# Patient Record
Sex: FEMALE | URBAN - METROPOLITAN AREA
[De-identification: names, ages, dates, MRNs, and addresses within clinical notes are randomized per-mention and may not be internally consistent; named-entity substitution may affect disease eponyms.]

---

## 2019-02-12 PROBLEM — F50.9 EATING DISORDER: Status: ACTIVE | Noted: 2019-02-12

## 2019-04-26 ENCOUNTER — APPOINTMENT (OUTPATIENT)
Age: 24
Setting detail: DERMATOLOGY
End: 2019-04-26

## 2019-04-26 DIAGNOSIS — L70.0 ACNE VULGARIS: ICD-10-CM

## 2019-04-26 PROCEDURE — OTHER PRESCRIPTION: OTHER

## 2019-04-26 PROCEDURE — OTHER URINE PREGNANCY TEST: OTHER

## 2019-04-26 PROCEDURE — OTHER COUNSELING: ISOTRETINOIN: OTHER

## 2019-04-26 PROCEDURE — 81025 URINE PREGNANCY TEST: CPT

## 2019-04-26 RX ORDER — ISOTRETINOIN 40 MG/1
40 MG CAPSULE, LIQUID FILLED ORAL QD
Qty: 30 | Refills: 0 | Status: ERX | COMMUNITY
Start: 2019-04-26

## 2019-05-22 ENCOUNTER — APPOINTMENT (OUTPATIENT)
Age: 24
Setting detail: DERMATOLOGY
End: 2019-05-27

## 2019-05-22 VITALS — HEIGHT: 65 IN | WEIGHT: 110 LBS | RESPIRATION RATE: 16 BRPM

## 2019-05-22 DIAGNOSIS — L70.0 ACNE VULGARIS: ICD-10-CM

## 2019-05-22 DIAGNOSIS — L60.3 NAIL DYSTROPHY: ICD-10-CM

## 2019-05-22 PROCEDURE — OTHER COUNSELING: ISOTRETINOIN: OTHER

## 2019-05-22 PROCEDURE — OTHER ISOTRETINOIN MONITORING: OTHER

## 2019-05-22 PROCEDURE — OTHER PRESCRIPTION: OTHER

## 2019-05-22 PROCEDURE — 81025 URINE PREGNANCY TEST: CPT

## 2019-05-22 PROCEDURE — 99214 OFFICE O/P EST MOD 30 MIN: CPT

## 2019-05-22 PROCEDURE — OTHER URINE PREGNANCY TEST: OTHER

## 2019-05-22 RX ORDER — CICLOPIROX 80 MG/ML
8% SOLUTION TOPICAL EVERY NIGHT
Qty: 1 | Refills: 0 | Status: ERX | COMMUNITY
Start: 2019-05-22

## 2019-05-22 ASSESSMENT — LOCATION DETAILED DESCRIPTION DERM
LOCATION DETAILED: RIGHT INFERIOR CENTRAL MALAR CHEEK
LOCATION DETAILED: INFERIOR MID FOREHEAD
LOCATION DETAILED: LEFT CENTRAL BUCCAL CHEEK

## 2019-05-22 ASSESSMENT — LOCATION ZONE DERM: LOCATION ZONE: FACE

## 2019-05-22 ASSESSMENT — LOCATION SIMPLE DESCRIPTION DERM
LOCATION SIMPLE: LEFT CHEEK
LOCATION SIMPLE: RIGHT CHEEK
LOCATION SIMPLE: INFERIOR FOREHEAD

## 2019-07-01 ENCOUNTER — APPOINTMENT (OUTPATIENT)
Age: 24
Setting detail: DERMATOLOGY
End: 2019-07-01

## 2019-07-01 VITALS — WEIGHT: 100 LBS | RESPIRATION RATE: 16 BRPM | HEIGHT: 66 IN

## 2019-07-01 DIAGNOSIS — L70.0 ACNE VULGARIS: ICD-10-CM

## 2019-07-01 PROCEDURE — OTHER PRESCRIPTION: OTHER

## 2019-07-01 PROCEDURE — 99213 OFFICE O/P EST LOW 20 MIN: CPT

## 2019-07-01 PROCEDURE — 81025 URINE PREGNANCY TEST: CPT

## 2019-07-01 PROCEDURE — OTHER ISOTRETINOIN MONITORING: OTHER

## 2019-07-01 PROCEDURE — OTHER URINE PREGNANCY TEST: OTHER

## 2019-07-01 RX ORDER — ISOTRETINOIN 40 MG/1
CAPSULE, LIQUID FILLED ORAL QD
Qty: 30 | Refills: 0 | Status: ERX

## 2019-07-01 ASSESSMENT — LOCATION ZONE DERM: LOCATION ZONE: FACE

## 2019-07-01 ASSESSMENT — LOCATION SIMPLE DESCRIPTION DERM: LOCATION SIMPLE: LEFT CHEEK

## 2019-07-01 ASSESSMENT — LOCATION DETAILED DESCRIPTION DERM: LOCATION DETAILED: LEFT INFERIOR MEDIAL MALAR CHEEK

## 2019-07-01 NOTE — HPI: MEDICATION (ACCUTANE)
How Severe Is It?: moderate
Is This A New Presentation, Or A Follow-Up?: Evaluation for Accutane
Additional History: Water 32 oz, Gatorade,

## 2019-07-29 ENCOUNTER — APPOINTMENT (OUTPATIENT)
Age: 24
Setting detail: DERMATOLOGY
End: 2019-07-31

## 2019-07-29 VITALS — RESPIRATION RATE: 16 BRPM | HEIGHT: 55 IN | WEIGHT: 220.46 LBS

## 2019-07-29 DIAGNOSIS — L70.0 ACNE VULGARIS: ICD-10-CM

## 2019-07-29 DIAGNOSIS — L72.8 OTHER FOLLICULAR CYSTS OF THE SKIN AND SUBCUTANEOUS TISSUE: ICD-10-CM

## 2019-07-29 PROCEDURE — 81025 URINE PREGNANCY TEST: CPT

## 2019-07-29 PROCEDURE — 99214 OFFICE O/P EST MOD 30 MIN: CPT

## 2019-07-29 PROCEDURE — OTHER COUNSELING: OTHER

## 2019-07-29 PROCEDURE — OTHER ISOTRETINOIN MONITORING: OTHER

## 2019-07-29 PROCEDURE — OTHER COUNSELING: ISOTRETINOIN: OTHER

## 2019-07-29 PROCEDURE — OTHER URINE PREGNANCY TEST: OTHER

## 2019-07-29 ASSESSMENT — LOCATION ZONE DERM: LOCATION ZONE: FACE

## 2019-07-29 ASSESSMENT — LOCATION DETAILED DESCRIPTION DERM
LOCATION DETAILED: LEFT SUPERIOR CENTRAL MALAR CHEEK
LOCATION DETAILED: LEFT INFERIOR CENTRAL MALAR CHEEK

## 2019-07-29 ASSESSMENT — LOCATION SIMPLE DESCRIPTION DERM: LOCATION SIMPLE: LEFT CHEEK

## 2019-07-29 NOTE — PROCEDURE: COUNSELING
Topical Retinoid counseling:  Patient advised to apply a pea-sized amount only at bedtime and wait 30 minutes after washing their face before applying.  If too drying, patient may add a non-comedogenic moisturizer. The patient verbalized understanding of the proper use and possible adverse effects of retinoids.  All of the patient's questions and concerns were addressed.
Minocycline Pregnancy And Lactation Text: This medication is Pregnancy Category D and not consider safe during pregnancy. It is also excreted in breast milk.
Erythromycin Pregnancy And Lactation Text: This medication is Pregnancy Category B and is considered safe during pregnancy. It is also excreted in breast milk.
Dapsone Counseling: I discussed with the patient the risks of dapsone including but not limited to hemolytic anemia, agranulocytosis, rashes, methemoglobinemia, kidney failure, peripheral neuropathy, headaches, GI upset, and liver toxicity.  Patients who start dapsone require monitoring including baseline LFTs and weekly CBCs for the first month, then every month thereafter.  The patient verbalized understanding of the proper use and possible adverse effects of dapsone.  All of the patient's questions and concerns were addressed.
Spironolactone Pregnancy And Lactation Text: This medication can cause feminization of the male fetus and should be avoided during pregnancy. The active metabolite is also found in breast milk.
Topical Retinoid Pregnancy And Lactation Text: This medication is Pregnancy Category C. It is unknown if this medication is excreted in breast milk.
Patient Specific Counseling (Will Not Stick From Patient To Patient): Discussed using her clindamycin 1% lotion to area until completely resolved.
Minocycline Counseling: Patient advised regarding possible photosensitivity and discoloration of the teeth, skin, lips, tongue and gums.  Patient instructed to avoid sunlight, if possible.  When exposed to sunlight, patients should wear protective clothing, sunglasses, and sunscreen.  The patient was instructed to call the office immediately if the following severe adverse effects occur:  hearing changes, easy bruising/bleeding, severe headache, or vision changes.  The patient verbalized understanding of the proper use and possible adverse effects of minocycline.  All of the patient's questions and concerns were addressed.
Bactrim Counseling:  I discussed with the patient the risks of sulfa antibiotics including but not limited to GI upset, allergic reaction, drug rash, diarrhea, dizziness, photosensitivity, and yeast infections.  Rarely, more serious reactions can occur including but not limited to aplastic anemia, agranulocytosis, methemoglobinemia, blood dyscrasias, liver or kidney failure, lung infiltrates or desquamative/blistering drug rashes.
Tetracycline Counseling: Patient counseled regarding possible photosensitivity and increased risk for sunburn.  Patient instructed to avoid sunlight, if possible.  When exposed to sunlight, patients should wear protective clothing, sunglasses, and sunscreen.  The patient was instructed to call the office immediately if the following severe adverse effects occur:  hearing changes, easy bruising/bleeding, severe headache, or vision changes.  The patient verbalized understanding of the proper use and possible adverse effects of tetracycline.  All of the patient's questions and concerns were addressed. Patient understands to avoid pregnancy while on therapy due to potential birth defects.
Birth Control Pills Pregnancy And Lactation Text: This medication should be avoided if pregnant and for the first 30 days post-partum.
Benzoyl Peroxide Counseling: Patient counseled that medicine may cause skin irritation and bleach clothing.  In the event of skin irritation, the patient was advised to reduce the amount of the drug applied or use it less frequently.   The patient verbalized understanding of the proper use and possible adverse effects of benzoyl peroxide.  All of the patient's questions and concerns were addressed.
Doxycycline Pregnancy And Lactation Text: This medication is Pregnancy Category D and not consider safe during pregnancy. It is also excreted in breast milk but is considered safe for shorter treatment courses.
Topical Sulfur Applications Counseling: Topical Sulfur Counseling: Patient counseled that this medication may cause skin irritation or allergic reactions.  In the event of skin irritation, the patient was advised to reduce the amount of the drug applied or use it less frequently.   The patient verbalized understanding of the proper use and possible adverse effects of topical sulfur application.  All of the patient's questions and concerns were addressed.
High Dose Vitamin A Counseling: Side effects reviewed, pt to contact office should one occur.
Isotretinoin Pregnancy And Lactation Text: This medication is Pregnancy Category X and is considered extremely dangerous during pregnancy. It is unknown if it is excreted in breast milk.
Spironolactone Counseling: Patient advised regarding risks of diarrhea, abdominal pain, hyperkalemia, birth defects (for female patients), liver toxicity and renal toxicity. The patient may need blood work to monitor liver and kidney function and potassium levels while on therapy. The patient verbalized understanding of the proper use and possible adverse effects of spironolactone.  All of the patient's questions and concerns were addressed.
Birth Control Pills Counseling: Birth Control Pill Counseling: I discussed with the patient the potential side effects of OCPs including but not limited to increased risk of stroke, heart attack, thrombophlebitis, deep venous thrombosis, hepatic adenomas, breast changes, GI upset, headaches, and depression.  The patient verbalized understanding of the proper use and possible adverse effects of OCPs. All of the patient's questions and concerns were addressed.
Topical Clindamycin Counseling: Patient counseled that this medication may cause skin irritation or allergic reactions.  In the event of skin irritation, the patient was advised to reduce the amount of the drug applied or use it less frequently.   The patient verbalized understanding of the proper use and possible adverse effects of clindamycin.  All of the patient's questions and concerns were addressed.
Azithromycin Counseling:  I discussed with the patient the risks of azithromycin including but not limited to GI upset, allergic reaction, drug rash, diarrhea, and yeast infections.
Topical Clindamycin Pregnancy And Lactation Text: This medication is Pregnancy Category B and is considered safe during pregnancy. It is unknown if it is excreted in breast milk.
Bactrim Pregnancy And Lactation Text: This medication is Pregnancy Category D and is known to cause fetal risk.  It is also excreted in breast milk.
Tazorac Counseling:  Patient advised that medication is irritating and drying.  Patient may need to apply sparingly and wash off after an hour before eventually leaving it on overnight.  The patient verbalized understanding of the proper use and possible adverse effects of tazorac.  All of the patient's questions and concerns were addressed.
Topical Sulfur Applications Pregnancy And Lactation Text: This medication is Pregnancy Category C and has an unknown safety profile during pregnancy. It is unknown if this topical medication is excreted in breast milk.
Use Enhanced Medication Counseling?: No
Patient Specific Counseling (Will Not Stick From Patient To Patient): Will increase the dose up to 30mg BID. Discussed increasing water intake. Finish the pills at home and then start the 30mg BID. Will do fasting labs next month. Confirmed in ipledge.
Erythromycin Counseling:  I discussed with the patient the risks of erythromycin including but not limited to GI upset, allergic reaction, drug rash, diarrhea, increase in liver enzymes, and yeast infections.
Benzoyl Peroxide Pregnancy And Lactation Text: This medication is Pregnancy Category C. It is unknown if benzoyl peroxide is excreted in breast milk.
Tazorac Pregnancy And Lactation Text: This medication is not safe during pregnancy. It is unknown if this medication is excreted in breast milk.
Dapsone Pregnancy And Lactation Text: This medication is Pregnancy Category C and is not considered safe during pregnancy or breast feeding.
Detail Level: Zone
Isotretinoin Counseling: Patient should get monthly blood tests, not donate blood, not drive at night if vision affected, not share medication, and not undergo elective surgery for 6 months after tx completed. Side effects reviewed, pt to contact office should one occur.
Doxycycline Counseling:  Patient counseled regarding possible photosensitivity and increased risk for sunburn.  Patient instructed to avoid sunlight, if possible.  When exposed to sunlight, patients should wear protective clothing, sunglasses, and sunscreen.  The patient was instructed to call the office immediately if the following severe adverse effects occur:  hearing changes, easy bruising/bleeding, severe headache, or vision changes.  The patient verbalized understanding of the proper use and possible adverse effects of doxycycline.  All of the patient's questions and concerns were addressed.
Detail Level: Detailed
Azithromycin Pregnancy And Lactation Text: This medication is considered safe during pregnancy and is also secreted in breast milk.
High Dose Vitamin A Pregnancy And Lactation Text: High dose vitamin A therapy is contraindicated during pregnancy and breast feeding.

## 2019-07-29 NOTE — PROCEDURE: ISOTRETINOIN MONITORING
Nosebleeds Normal Treatment: I explained this is common when taking isotretinoin. Pt also has seasonal allergies.

## 2019-07-31 ENCOUNTER — RX ONLY (RX ONLY)
Age: 24
End: 2019-07-31

## 2019-07-31 RX ORDER — ISOTRETINOIN 30 MG/1
30 MG CAPSULE, LIQUID FILLED ORAL TWICE A DAY
Qty: 60 | Refills: 0 | Status: ERX | COMMUNITY
Start: 2019-07-31

## 2019-12-04 PROBLEM — F32.0 MILD MAJOR DEPRESSION (H): Status: ACTIVE | Noted: 2019-12-04

## 2020-04-14 NOTE — PROCEDURE: ISOTRETINOIN MONITORING
I have complete yany in the body's ability to heal and transform. The products and items listed below (the “Products”)  and their claims have not been evaluated by the Food and Drug Administration.  Dietary products are not intended to treat, prevent, m Directions: 1 capsule by mouth daily for 2 weeks, then increase to 1 capsule twice daily. After another 2 weeks increase to 1 capsule three times per day. Where to Find: www.[employer]. [employer]  Why: Aids in the repair and function of the gut mu Banana  Beef  Broccoli  Coffee  Corn  Chicken  Peanut  Tomato  White Potato  Almonds  Pineapple  Lafferty  Shrimp  Turmeric  Please note: The FIT 22 test does not come with the mobile phone mary or meal plan.     [facility] Inflammation Test for [employer] Nosebleeds Normal Treatment: I explained this is common when taking isotretinoin. Pt also has seasonal allergies.

## 2020-10-21 ENCOUNTER — APPOINTMENT (OUTPATIENT)
Dept: URBAN - METROPOLITAN AREA CLINIC 252 | Age: 25
Setting detail: DERMATOLOGY
End: 2020-10-25

## 2020-10-21 VITALS — WEIGHT: 100 LBS | HEIGHT: 66 IN | RESPIRATION RATE: 14 BRPM

## 2020-10-21 DIAGNOSIS — L70.0 ACNE VULGARIS: ICD-10-CM

## 2020-10-21 DIAGNOSIS — L90.5 SCAR CONDITIONS AND FIBROSIS OF SKIN: ICD-10-CM

## 2020-10-21 PROCEDURE — OTHER COUNSELING: OTHER

## 2020-10-21 PROCEDURE — 99213 OFFICE O/P EST LOW 20 MIN: CPT | Mod: 25

## 2020-10-21 PROCEDURE — OTHER ADDITIONAL NOTES: OTHER

## 2020-10-21 PROCEDURE — 11900 INJECT SKIN LESIONS </W 7: CPT

## 2020-10-21 PROCEDURE — OTHER INTRALESIONAL KENALOG: OTHER

## 2020-10-21 PROCEDURE — OTHER PRESCRIPTION: OTHER

## 2020-10-21 RX ORDER — CLINDAMYCIN PHOSPHATE 10 MG/G
1% GEL TOPICAL QAM
Qty: 1 | Refills: 1 | Status: ERX | COMMUNITY
Start: 2020-10-21

## 2020-10-21 RX ORDER — TRETIONIN 0.25 MG/G
0.025% CREAM TOPICAL QD
Qty: 1 | Refills: 3 | Status: ERX | COMMUNITY
Start: 2020-10-21

## 2020-10-21 ASSESSMENT — LOCATION DETAILED DESCRIPTION DERM
LOCATION DETAILED: LEFT INFERIOR CENTRAL MALAR CHEEK
LOCATION DETAILED: RIGHT INFERIOR CENTRAL MALAR CHEEK
LOCATION DETAILED: RIGHT INFERIOR LATERAL MALAR CHEEK

## 2020-10-21 ASSESSMENT — LOCATION SIMPLE DESCRIPTION DERM
LOCATION SIMPLE: RIGHT CHEEK
LOCATION SIMPLE: LEFT CHEEK

## 2020-10-21 ASSESSMENT — LOCATION ZONE DERM: LOCATION ZONE: FACE

## 2020-10-21 NOTE — PROCEDURE: ADDITIONAL NOTES
Detail Level: Simple
Additional Notes: Will consider spironolactone next OV if patient continues to break out

## 2020-10-21 NOTE — HPI: PIMPLES (ACNE)
What Type Of Note Output Would You Prefer (Optional)?: Bullet Format
How Severe Is Your Acne?: mild
Is This A New Presentation, Or A Follow-Up?: Acne
Additional Comments (Use Complete Sentences): Works at [employer]

## 2020-10-21 NOTE — PROCEDURE: INTRALESIONAL KENALOG
Consent: The risks of atrophy were reviewed with the patient.
Medical Necessity Clause: This procedure was medically necessary because the lesions that were treated were:
Total Volume Injected (Ccs- Only Use Numbers And Decimals): 0.09
X Size Of Lesion In Cm (Optional): 0
Kenalog Preparation: Kenalog
Include Z78.9 (Other Specified Conditions Influencing Health Status) As An Associated Diagnosis?: No
Concentration Of Solution Injected (Mg/Ml): 2.0
Detail Level: Detailed

## 2020-10-21 NOTE — PROCEDURE: COUNSELING
Isotretinoin Pregnancy And Lactation Text: This medication is Pregnancy Category X and is considered extremely dangerous during pregnancy. It is unknown if it is excreted in breast milk.
Tazorac Counseling:  Patient advised that medication is irritating and drying.  Patient may need to apply sparingly and wash off after an hour before eventually leaving it on overnight.  The patient verbalized understanding of the proper use and possible adverse effects of tazorac.  All of the patient's questions and concerns were addressed.
Topical Retinoid Pregnancy And Lactation Text: This medication is Pregnancy Category C. It is unknown if this medication is excreted in breast milk.
Bactrim Counseling:  I discussed with the patient the risks of sulfa antibiotics including but not limited to GI upset, allergic reaction, drug rash, diarrhea, dizziness, photosensitivity, and yeast infections.  Rarely, more serious reactions can occur including but not limited to aplastic anemia, agranulocytosis, methemoglobinemia, blood dyscrasias, liver or kidney failure, lung infiltrates or desquamative/blistering drug rashes.
Topical Sulfur Applications Pregnancy And Lactation Text: This medication is Pregnancy Category C and has an unknown safety profile during pregnancy. It is unknown if this topical medication is excreted in breast milk.
Birth Control Pills Counseling: Birth Control Pill Counseling: I discussed with the patient the potential side effects of OCPs including but not limited to increased risk of stroke, heart attack, thrombophlebitis, deep venous thrombosis, hepatic adenomas, breast changes, GI upset, headaches, and depression.  The patient verbalized understanding of the proper use and possible adverse effects of OCPs. All of the patient's questions and concerns were addressed.
Doxycycline Counseling:  Patient counseled regarding possible photosensitivity and increased risk for sunburn.  Patient instructed to avoid sunlight, if possible.  When exposed to sunlight, patients should wear protective clothing, sunglasses, and sunscreen.  The patient was instructed to call the office immediately if the following severe adverse effects occur:  hearing changes, easy bruising/bleeding, severe headache, or vision changes.  The patient verbalized understanding of the proper use and possible adverse effects of doxycycline.  All of the patient's questions and concerns were addressed.
Topical Clindamycin Counseling: Patient counseled that this medication may cause skin irritation or allergic reactions.  In the event of skin irritation, the patient was advised to reduce the amount of the drug applied or use it less frequently.   The patient verbalized understanding of the proper use and possible adverse effects of clindamycin.  All of the patient's questions and concerns were addressed.
Benzoyl Peroxide Counseling: Patient counseled that medicine may cause skin irritation and bleach clothing.  In the event of skin irritation, the patient was advised to reduce the amount of the drug applied or use it less frequently.   The patient verbalized understanding of the proper use and possible adverse effects of benzoyl peroxide.  All of the patient's questions and concerns were addressed.
Spironolactone Pregnancy And Lactation Text: This medication can cause feminization of the male fetus and should be avoided during pregnancy. The active metabolite is also found in breast milk.
Bactrim Pregnancy And Lactation Text: This medication is Pregnancy Category D and is known to cause fetal risk.  It is also excreted in breast milk.
Tetracycline Pregnancy And Lactation Text: This medication is Pregnancy Category D and not consider safe during pregnancy. It is also excreted in breast milk.
Spironolactone Counseling: Patient advised regarding risks of diarrhea, abdominal pain, hyperkalemia, birth defects (for female patients), liver toxicity and renal toxicity. The patient may need blood work to monitor liver and kidney function and potassium levels while on therapy. The patient verbalized understanding of the proper use and possible adverse effects of spironolactone.  All of the patient's questions and concerns were addressed.
Topical Retinoid counseling:  Patient advised to apply a pea-sized amount only at bedtime and wait 30 minutes after washing their face before applying.  If too drying, patient may add a non-comedogenic moisturizer. The patient verbalized understanding of the proper use and possible adverse effects of retinoids.  All of the patient's questions and concerns were addressed.
Benzoyl Peroxide Pregnancy And Lactation Text: This medication is Pregnancy Category C. It is unknown if benzoyl peroxide is excreted in breast milk.
Erythromycin Pregnancy And Lactation Text: This medication is Pregnancy Category B and is considered safe during pregnancy. It is also excreted in breast milk.
Isotretinoin Counseling: Patient should get monthly blood tests, not donate blood, not drive at night if vision affected, not share medication, and not undergo elective surgery for 6 months after tx completed. Side effects reviewed, pt to contact office should one occur.
Detail Level: Detailed
High Dose Vitamin A Pregnancy And Lactation Text: High dose vitamin A therapy is contraindicated during pregnancy and breast feeding.
Minocycline Counseling: Patient advised regarding possible photosensitivity and discoloration of the teeth, skin, lips, tongue and gums.  Patient instructed to avoid sunlight, if possible.  When exposed to sunlight, patients should wear protective clothing, sunglasses, and sunscreen.  The patient was instructed to call the office immediately if the following severe adverse effects occur:  hearing changes, easy bruising/bleeding, severe headache, or vision changes.  The patient verbalized understanding of the proper use and possible adverse effects of minocycline.  All of the patient's questions and concerns were addressed.
Tazorac Pregnancy And Lactation Text: This medication is not safe during pregnancy. It is unknown if this medication is excreted in breast milk.
Azithromycin Counseling:  I discussed with the patient the risks of azithromycin including but not limited to GI upset, allergic reaction, drug rash, diarrhea, and yeast infections.
Birth Control Pills Pregnancy And Lactation Text: This medication should be avoided if pregnant and for the first 30 days post-partum.
High Dose Vitamin A Counseling: Side effects reviewed, pt to contact office should one occur.
Tetracycline Counseling: Patient counseled regarding possible photosensitivity and increased risk for sunburn.  Patient instructed to avoid sunlight, if possible.  When exposed to sunlight, patients should wear protective clothing, sunglasses, and sunscreen.  The patient was instructed to call the office immediately if the following severe adverse effects occur:  hearing changes, easy bruising/bleeding, severe headache, or vision changes.  The patient verbalized understanding of the proper use and possible adverse effects of tetracycline.  All of the patient's questions and concerns were addressed. Patient understands to avoid pregnancy while on therapy due to potential birth defects.
Sarecycline Counseling: Patient advised regarding possible photosensitivity and discoloration of the teeth, skin, lips, tongue and gums.  Patient instructed to avoid sunlight, if possible.  When exposed to sunlight, patients should wear protective clothing, sunglasses, and sunscreen.  The patient was instructed to call the office immediately if the following severe adverse effects occur:  hearing changes, easy bruising/bleeding, severe headache, or vision changes.  The patient verbalized understanding of the proper use and possible adverse effects of sarecycline.  All of the patient's questions and concerns were addressed.
Topical Sulfur Applications Counseling: Topical Sulfur Counseling: Patient counseled that this medication may cause skin irritation or allergic reactions.  In the event of skin irritation, the patient was advised to reduce the amount of the drug applied or use it less frequently.   The patient verbalized understanding of the proper use and possible adverse effects of topical sulfur application.  All of the patient's questions and concerns were addressed.
Dapsone Counseling: I discussed with the patient the risks of dapsone including but not limited to hemolytic anemia, agranulocytosis, rashes, methemoglobinemia, kidney failure, peripheral neuropathy, headaches, GI upset, and liver toxicity.  Patients who start dapsone require monitoring including baseline LFTs and weekly CBCs for the first month, then every month thereafter.  The patient verbalized understanding of the proper use and possible adverse effects of dapsone.  All of the patient's questions and concerns were addressed.
Doxycycline Pregnancy And Lactation Text: This medication is Pregnancy Category D and not consider safe during pregnancy. It is also excreted in breast milk but is considered safe for shorter treatment courses.
Dapsone Pregnancy And Lactation Text: This medication is Pregnancy Category C and is not considered safe during pregnancy or breast feeding.
Erythromycin Counseling:  I discussed with the patient the risks of erythromycin including but not limited to GI upset, allergic reaction, drug rash, diarrhea, increase in liver enzymes, and yeast infections.
Detail Level: Zone
Topical Clindamycin Pregnancy And Lactation Text: This medication is Pregnancy Category B and is considered safe during pregnancy. It is unknown if it is excreted in breast milk.
Azithromycin Pregnancy And Lactation Text: This medication is considered safe during pregnancy and is also secreted in breast milk.
Use Enhanced Medication Counseling?: No

## 2020-11-20 NOTE — PROCEDURE: ISOTRETINOIN MONITORING
1 pair Cheilitis Normal Treatment: I recommended application of Vaseline or Aquaphor numerous times a day (as often as every hour) and before going to bed.

## 2020-12-30 PROBLEM — M54.42 BILATERAL LOW BACK PAIN WITH LEFT-SIDED SCIATICA: Status: ACTIVE | Noted: 2020-12-30

## 2021-02-18 PROBLEM — M54.42 BILATERAL LOW BACK PAIN WITH LEFT-SIDED SCIATICA: Status: RESOLVED | Noted: 2020-12-30 | Resolved: 2021-02-18

## 2021-10-19 PROBLEM — F32.9 MAJOR DEPRESSION: Status: ACTIVE | Noted: 2019-12-04

## 2023-03-21 PROBLEM — F41.1 GAD (GENERALIZED ANXIETY DISORDER): Status: ACTIVE | Noted: 2023-03-21

## 2023-03-21 PROBLEM — F32.0 MILD MAJOR DEPRESSION (H): Status: ACTIVE | Noted: 2023-03-21

## 2023-05-24 ENCOUNTER — APPOINTMENT (OUTPATIENT)
Dept: URBAN - METROPOLITAN AREA CLINIC 252 | Age: 28
Setting detail: DERMATOLOGY
End: 2023-05-29

## 2023-05-24 VITALS — WEIGHT: 108 LBS | HEIGHT: 65 IN | RESPIRATION RATE: 18 BRPM

## 2023-05-24 DIAGNOSIS — L90.5 SCAR CONDITIONS AND FIBROSIS OF SKIN: ICD-10-CM

## 2023-05-24 PROCEDURE — OTHER COUNSELING: OTHER

## 2023-05-24 PROCEDURE — 99212 OFFICE O/P EST SF 10 MIN: CPT

## 2023-05-24 ASSESSMENT — LOCATION DETAILED DESCRIPTION DERM
LOCATION DETAILED: RIGHT LATERAL BREAST 6-7:00 REGION
LOCATION DETAILED: RIGHT PERIAREOLAR BREAST 6-7:00 REGION
LOCATION DETAILED: LEFT MEDIAL BREAST 6-7:00 REGION

## 2023-05-24 ASSESSMENT — LOCATION ZONE DERM: LOCATION ZONE: TRUNK

## 2023-05-24 ASSESSMENT — LOCATION SIMPLE DESCRIPTION DERM
LOCATION SIMPLE: LEFT BREAST
LOCATION SIMPLE: RIGHT BREAST

## 2023-05-24 NOTE — PROCEDURE: COUNSELING
Patient Specific Counseling (Will Not Stick From Patient To Patient): -Patient 1 year post op from breast augmentation. Informed her that with time, it should improve \\n-Discussed laser treatment options such as VBeam. [facility] laser specialist may have more options \\n-Recommend silicone sheeting, gave brand name biodermis an am example option
Detail Level: Simple

## 2023-08-30 PROBLEM — E46 PROTEIN-CALORIE MALNUTRITION (H): Status: ACTIVE | Noted: 2023-08-30

## 2024-01-08 PROBLEM — G43.011 INTRACTABLE MIGRAINE WITHOUT AURA AND WITH STATUS MIGRAINOSUS: Status: ACTIVE | Noted: 2024-01-08

## 2024-01-08 PROBLEM — M54.42 ACUTE BILATERAL LOW BACK PAIN WITH BILATERAL SCIATICA: Status: ACTIVE | Noted: 2024-01-08

## 2024-01-08 PROBLEM — F90.0 ADHD (ATTENTION DEFICIT HYPERACTIVITY DISORDER), INATTENTIVE TYPE: Status: ACTIVE | Noted: 2023-04-20

## 2024-01-08 PROBLEM — F33.1 MAJOR DEPRESSIVE DISORDER, RECURRENT EPISODE, MODERATE (H): Status: ACTIVE | Noted: 2024-01-08

## 2024-01-08 PROBLEM — M54.41 ACUTE BILATERAL LOW BACK PAIN WITH BILATERAL SCIATICA: Status: ACTIVE | Noted: 2024-01-08

## 2024-01-30 ENCOUNTER — APPOINTMENT (OUTPATIENT)
Dept: URBAN - METROPOLITAN AREA CLINIC 252 | Age: 29
Setting detail: DERMATOLOGY
End: 2024-01-31

## 2024-01-30 VITALS — WEIGHT: 110 LBS | RESPIRATION RATE: 18 BRPM | HEIGHT: 65 IN

## 2024-01-30 DIAGNOSIS — L70.0 ACNE VULGARIS: ICD-10-CM

## 2024-01-30 DIAGNOSIS — D49.2 NEOPLASM OF UNSPECIFIED BEHAVIOR OF BONE, SOFT TISSUE, AND SKIN: ICD-10-CM

## 2024-01-30 PROCEDURE — 99213 OFFICE O/P EST LOW 20 MIN: CPT | Mod: 25

## 2024-01-30 PROCEDURE — 11300 SHAVE SKIN LESION 0.5 CM/<: CPT

## 2024-01-30 PROCEDURE — OTHER ADDITIONAL NOTES: OTHER

## 2024-01-30 PROCEDURE — OTHER SHAVE REMOVAL (NO PATHOLOGY): OTHER

## 2024-01-30 PROCEDURE — OTHER COUNSELING: OTHER

## 2024-01-30 PROCEDURE — OTHER PRESCRIPTION: OTHER

## 2024-01-30 RX ORDER — TRETINOIN 0.1 MG/G
0.01% GEL TOPICAL QD
Qty: 45 | Refills: 3 | Status: ERX | COMMUNITY
Start: 2024-01-30

## 2024-01-30 ASSESSMENT — LOCATION DETAILED DESCRIPTION DERM
LOCATION DETAILED: LEFT MID-UPPER BACK
LOCATION DETAILED: LEFT INFERIOR CENTRAL MALAR CHEEK

## 2024-01-30 ASSESSMENT — LOCATION SIMPLE DESCRIPTION DERM
LOCATION SIMPLE: LEFT UPPER BACK
LOCATION SIMPLE: LEFT CHEEK

## 2024-01-30 ASSESSMENT — LOCATION ZONE DERM
LOCATION ZONE: TRUNK
LOCATION ZONE: FACE

## 2024-01-30 NOTE — HPI: SKIN LESION
What Type Of Note Output Would You Prefer (Optional)?: Bullet Format
How Severe Is Your Skin Lesion?: moderate
Has Your Skin Lesion Been Treated?: not been treated
Is This A New Presentation, Or A Follow-Up?: Skin Lesion
Additional History: Defers pathology report

## 2024-04-01 PROBLEM — L70.0 ACNE VULGARIS: Status: ACTIVE | Noted: 2024-04-01

## 2024-05-24 PROBLEM — M54.50 RECURRENT LOW BACK PAIN: Status: ACTIVE | Noted: 2024-05-24

## 2024-12-16 ENCOUNTER — ANCILLARY PROCEDURE (OUTPATIENT)
Dept: INTERVENTIONAL RADIOLOGY/VASCULAR | Facility: CLINIC | Age: 29
End: 2024-12-16
Attending: PHYSICIAN ASSISTANT
Payer: COMMERCIAL

## 2024-12-16 VITALS
DIASTOLIC BLOOD PRESSURE: 83 MMHG | SYSTOLIC BLOOD PRESSURE: 134 MMHG | HEART RATE: 105 BPM | RESPIRATION RATE: 18 BRPM | OXYGEN SATURATION: 99 % | TEMPERATURE: 98.1 F

## 2024-12-16 DIAGNOSIS — M54.16 LUMBAR RADICULOPATHY: ICD-10-CM

## 2024-12-16 PROCEDURE — 64483 NJX AA&/STRD TFRM EPI L/S 1: CPT | Mod: LT | Performed by: RADIOLOGY

## 2024-12-16 RX ORDER — IOPAMIDOL 408 MG/ML
10 INJECTION, SOLUTION INTRATHECAL ONCE
Status: COMPLETED | OUTPATIENT
Start: 2024-12-16 | End: 2024-12-16

## 2024-12-16 RX ORDER — METHYLPREDNISOLONE ACETATE 80 MG/ML
80 INJECTION, SUSPENSION INTRA-ARTICULAR; INTRALESIONAL; INTRAMUSCULAR; SOFT TISSUE ONCE
Status: COMPLETED | OUTPATIENT
Start: 2024-12-16 | End: 2024-12-16

## 2024-12-16 RX ORDER — BUPIVACAINE HYDROCHLORIDE 5 MG/ML
10 INJECTION, SOLUTION PERINEURAL ONCE
Status: COMPLETED | OUTPATIENT
Start: 2024-12-16 | End: 2024-12-16

## 2024-12-16 RX ADMIN — IOPAMIDOL 4 ML: 408 INJECTION, SOLUTION INTRATHECAL at 11:21

## 2024-12-16 RX ADMIN — BUPIVACAINE HYDROCHLORIDE 10 MG: 5 INJECTION, SOLUTION PERINEURAL at 11:21

## 2024-12-16 RX ADMIN — METHYLPREDNISOLONE ACETATE 80 MG: 80 INJECTION, SUSPENSION INTRA-ARTICULAR; INTRALESIONAL; INTRAMUSCULAR; SOFT TISSUE at 11:21

## 2024-12-16 NOTE — DISCHARGE INSTRUCTIONS
Date: 12/16/24  Provider: Dr. JOCELYN Tapia, neuroradiology department  Location: [facility], Imaging Department, [place]    Home Care Instructions after your Steroid Injection:  If you have new numbness down your legs after the injection, this may last up to 4-6 hours, but is expected go away.   Over the next 24 to 48 hours, pain at the injection site may increase before it gets better.  For the next 48 hours, use ice packs for 15 minutes, 3-4 times a day for pain relief.  If you have a bandage, remove it tomorrow morning.     Do not submerge injection site in water for 24 hours (no baths. pools, hot tubs).  Showers are OK.            Relax today. Return to your regular activity tomorrow.  Do not drive for 24 hours.         Medications  Restart blood thinning/anticoagulant medication tomorrow at your regular dose.  If you are restarting warfarin, discuss a follow up plan/labs with you anticoagulation clinic.  Continue to take all other medications as ordered by your provider.    Common side effects may include:  Increased blood sugar.  (If you are diabetic, watch your blood sugar closely. Call your doctor to help control your blood sugar)   Insomnia  Heartburn  Flushed face  Water retention  Increased appetite    Important Information:  The steroid should help reduce swelling and pain. This may take from 3-14 days.   Follow up with the provider that ordered this injection in 3-4 weeks. Let them know if the injection was effective.  No vaccines in the next 14 days. (Ex. COVID or FLU VACCINE)    Call your doctor or go to the Emergency Room for evaluation:  If you have NEW severe pain.  Fever greater than 100.5 degrees.  Loss of control of your legs (arms or legs depending on level)        Do you rec E/C visit?

## 2024-12-17 ENCOUNTER — MYC MEDICAL ADVICE (OUTPATIENT)
Dept: ORTHOPEDICS | Facility: CLINIC | Age: 29
End: 2024-12-17
Payer: COMMERCIAL

## 2024-12-17 NOTE — TELEPHONE ENCOUNTER
Left Voicemail (1st Attempt) and Sent Mychart (1st Attempt) for the patient to call back and schedule the following:    Appointment type: Return Surgical Spine  Provider: Bishop Valadez  [place] date[place] [facility] [place]  Specialty phone number: [phone]  Can be virtual

## 2024-12-19 ENCOUNTER — VIRTUAL VISIT (OUTPATIENT)
Dept: FAMILY MEDICINE | Facility: CLINIC | Age: 29
End: 2024-12-19
Payer: COMMERCIAL

## 2024-12-19 ENCOUNTER — TELEPHONE (OUTPATIENT)
Dept: FAMILY MEDICINE | Facility: CLINIC | Age: 29
End: 2024-12-19

## 2024-12-19 ENCOUNTER — TELEPHONE (OUTPATIENT)
Dept: ORTHOPEDICS | Facility: CLINIC | Age: 29
End: 2024-12-19

## 2024-12-19 DIAGNOSIS — Z86.39: ICD-10-CM

## 2024-12-19 DIAGNOSIS — Z13.29 SCREENING FOR THYROID DISORDER: ICD-10-CM

## 2024-12-19 DIAGNOSIS — G62.9 NEUROPATHY: ICD-10-CM

## 2024-12-19 DIAGNOSIS — R53.83 FATIGUE, UNSPECIFIED TYPE: ICD-10-CM

## 2024-12-19 DIAGNOSIS — Z31.41 FERTILITY TESTING: ICD-10-CM

## 2024-12-19 DIAGNOSIS — G43.011 INTRACTABLE MIGRAINE WITHOUT AURA AND WITH STATUS MIGRAINOSUS: ICD-10-CM

## 2024-12-19 DIAGNOSIS — N92.6 IRREGULAR MENSES: ICD-10-CM

## 2024-12-19 DIAGNOSIS — Z13.220 SCREENING CHOLESTEROL LEVEL: ICD-10-CM

## 2024-12-19 DIAGNOSIS — R63.4 WEIGHT LOSS: ICD-10-CM

## 2024-12-19 DIAGNOSIS — F43.9 STRESS: Primary | ICD-10-CM

## 2024-12-19 DIAGNOSIS — F41.1 GAD (GENERALIZED ANXIETY DISORDER): ICD-10-CM

## 2024-12-19 DIAGNOSIS — F90.0 ADHD (ATTENTION DEFICIT HYPERACTIVITY DISORDER), INATTENTIVE TYPE: ICD-10-CM

## 2024-12-19 DIAGNOSIS — Z13.1 SCREENING FOR DIABETES MELLITUS: ICD-10-CM

## 2024-12-19 DIAGNOSIS — F33.1 MAJOR DEPRESSIVE DISORDER, RECURRENT EPISODE, MODERATE (H): ICD-10-CM

## 2024-12-19 DIAGNOSIS — F41.0 PANIC ATTACK: ICD-10-CM

## 2024-12-19 PROBLEM — E08.49 DIABETES DUE TO UNDRL CONDITION W OTH DIABETIC NEURO COMP (H): Status: ACTIVE | Noted: 2024-12-19

## 2024-12-19 RX ORDER — ALPRAZOLAM 1 MG/1
1 TABLET ORAL 2 TIMES DAILY PRN
Qty: 30 TABLET | Refills: 2 | Status: SHIPPED | OUTPATIENT
Start: 2024-12-19

## 2024-12-19 RX ORDER — DEXTROAMPHETAMINE SACCHARATE, AMPHETAMINE ASPARTATE MONOHYDRATE, DEXTROAMPHETAMINE SULFATE AND AMPHETAMINE SULFATE 7.5; 7.5; 7.5; 7.5 MG/1; MG/1; MG/1; MG/1
30 CAPSULE, EXTENDED RELEASE ORAL DAILY
Qty: 30 CAPSULE | Refills: 0 | Status: SHIPPED | OUTPATIENT
Start: 2025-01-18 | End: 2025-02-17

## 2024-12-19 RX ORDER — DEXTROAMPHETAMINE SACCHARATE, AMPHETAMINE ASPARTATE MONOHYDRATE, DEXTROAMPHETAMINE SULFATE AND AMPHETAMINE SULFATE 7.5; 7.5; 7.5; 7.5 MG/1; MG/1; MG/1; MG/1
30 CAPSULE, EXTENDED RELEASE ORAL DAILY
Qty: 30 CAPSULE | Refills: 0 | Status: SHIPPED | OUTPATIENT
Start: 2024-12-19 | End: 2025-01-18

## 2024-12-19 RX ORDER — DEXTROAMPHETAMINE SACCHARATE, AMPHETAMINE ASPARTATE MONOHYDRATE, DEXTROAMPHETAMINE SULFATE AND AMPHETAMINE SULFATE 7.5; 7.5; 7.5; 7.5 MG/1; MG/1; MG/1; MG/1
30 CAPSULE, EXTENDED RELEASE ORAL DAILY
Qty: 30 CAPSULE | Refills: 0 | Status: SHIPPED | OUTPATIENT
Start: 2025-02-17 | End: 2025-03-19

## 2024-12-19 NOTE — TELEPHONE ENCOUNTER
Prior Authorization Retail Medication Request    Medication/Dose: rimegepant (NURTEC) 75 MG ODT tablet  Diagnosis and ICD code (if different than what is on RX):    Intractable migraine without aura and with status migrainosus [G43.011]     New/renewal/insurance change PA/secondary ins. PA:  Previously Tried and Failed:  na  Rationale:  na    Insurance   Primary: [facility]  Insurance ID:  [id number]     Secondary (if applicable):aida  Insurance ID:  aida    Pharmacy Information (if different than what is on RX)  [facility]  Phone:  [phone]  Fax:na    Clinic Information  Preferred routing pool for dept communication: matt

## 2024-12-19 NOTE — TELEPHONE ENCOUNTER
Patient confirmed scheduled appointment:  Date: 1/20/25  Time: 2;20pm  Visit type: Return Surgical Spine  Provider: Bishop Valadez

## 2024-12-19 NOTE — TELEPHONE ENCOUNTER
----- Message from Ashanti SIMS sent at 12/17/2024  8:41 AM CST -----  Hi can you assist with scheduling a 1 month follow up appointment with . It can be virtual.    Ashanti SANTANA  ----- Message -----  From: Bishop NANCIE Valadez PA-C  Sent: 12/16/2024  12:21 PM CST  To: Haseeb Figueroa MA; Otto Grace ATC; #    Can we schedule a 1 month appointment with me, telephone visit, to review symptom relief after injection?    Thanks,  Bishop KAM Valadez PA-C  ----- Message -----  From: Interface, Radiant Ib  Sent: 12/16/2024  11:37 AM CST  To: Bishop NANCIE Valadez PA-C

## 2024-12-19 NOTE — PROGRESS NOTES
April is a 29 year old who is being evaluated via a billable video visit.    How would you like to obtain your AVS? MyChart  If the video visit is dropped, the invitation should be resent by: Text to cell phone: [phone]  Will anyone else be joining your video visit? No      Assessment & Plan     Stress    - Adult Mental Health  Referral; Future    Panic attack    - Adult Mental Health  Referral; Future  - ALPRAZolam (XANAX) 1 MG tablet; Take 1 tablet (1 mg) by mouth 2 times daily as needed (for panic attack only-use sparingly).    ESTEBAN (generalized anxiety disorder)    - Adult Mental Health  Referral; Future    Major depressive disorder, recurrent episode, moderate (H)    - Adult Mental Health  Referral; Future    H/O caloric malnutrition    - Adult Nutrition  Referral; Future    Weight loss    - Adult Nutrition  Referral; Future    Fertility testing    - Ob/Gyn  Referral; Future  - Follicle stimulating hormone; Future  - Luteinizing Hormone; Future  - Anti-Mullerian hormone; Future  - Estradiol; Future  - Comprehensive metabolic panel (BMP + Alb, Alk Phos, ALT, AST, Total. Bili, TP); Future  - Testosterone Free and Total; Future  - Cortisol; Future  - Prolactin; Future    Intractable migraine without aura and with status migrainosus    - Adult Neurology  Referral; Future  - rimegepant (NURTEC) 75 MG ODT tablet; Place 1 tablet (75 mg) under the tongue daily as needed for migraine. Maximum of 1 tablet every 24 hours.    ADHD (attention deficit hyperactivity disorder), inattentive type    - amphetamine-dextroamphetamine (ADDERALL XR) 30 MG 24 hr capsule; Take 1 capsule (30 mg) by mouth daily.  - amphetamine-dextroamphetamine (ADDERALL XR) 30 MG 24 hr capsule; Take 1 capsule (30 mg) by mouth daily.  - amphetamine-dextroamphetamine (ADDERALL XR) 30 MG 24 hr capsule; Take 1 capsule (30 mg) by mouth daily.    Screening for diabetes mellitus    -  Hemoglobin A1c; Future    Screening for thyroid disorder    - TSH with free T4 reflex; Future  - Thyroid peroxidase antibody; Future    Irregular menses    - TSH with free T4 reflex; Future  - Thyroid peroxidase antibody; Future  - CBC with platelets and differential; Future  - Ferritin; Future  - Follicle stimulating hormone; Future  - Luteinizing Hormone; Future  - Anti-Mullerian hormone; Future  - Estradiol; Future  - Comprehensive metabolic panel (BMP + Alb, Alk Phos, ALT, AST, Total. Bili, TP); Future  - Testosterone Free and Total; Future  - Cortisol; Future  - Prolactin; Future    Fatigue, unspecified type    - TSH with free T4 reflex; Future  - Thyroid peroxidase antibody; Future  - Iron and iron binding capacity; Future  - CBC with platelets and differential; Future  - Ferritin; Future  - Vitamin D Deficiency; Future    Neuropathy    - Vitamin B12; Future    Screening cholesterol level    - Lipid panel reflex to direct LDL Fasting; Future            CONSULTATION/REFERRAL to nutritionist, neurology, therapy, OB/GYN      Kin   April is a 29 year old, presenting for the following health issues:  RECHECK      12/19/2024    10:30 AM   Additional Questions   Roomed by Patient completed echeck in via navigaya     She reports her mental health is okay she has been under more stress recently due to the loss of 2 close friends /family members.  Additionally they have had to move.  She is unsure if it is related to stress or not but she has been having more headaches.  We have tried several medications for migraine that either have not worked or have not been covered.  She would like a referral to neurology.  Additionally will send an alternative med to see if it is covered.  She would like hormone labs done for fertility her periods have been very irregular, discussed referral to OB/GYN she is in agreement.  She will make lab appointment for labs.  Additionally she would like to see a nutritionist.  History of  "weight loss and protein malnutrition.  She also would like to get established with a therapist.  She has been feeling more fatigued.  She would like to see if increasing her Adderall will help her motivation and focus.  She has been using more clonazepam due to the grief and loss but is working on reducing.  Discussed use as sparingly as possible.  Risks of stimulants discussed.    History of Present Illness       Headaches:   Since the patient's last clinic visit, headaches are: worsened  The patient is getting headaches:  Often  She is not able to do normal daily activities when she has a migraine.  The patient is taking the following rescue/relief medications:  Ibuprofen (Advil, Motrin), Naproxyn (Aleve), Tylenol, Excedrin and other   Patient states \"I get only a small amount of relief\" from the rescue/relief medications.   The patient is taking the following medications to prevent migraines:  No medications to prevent migraines  In the past 4 weeks, the patient has gone to an Urgent Care or Emergency Room 0 times times due to headaches.    She eats 2-3 servings of fruits and vegetables daily.She consumes 2 sweetened beverage(s) daily.She exercises with enough effort to increase her heart rate 30 to 60 minutes per day.  She exercises with enough effort to increase her heart rate 7 days per week.   She is taking medications regularly.               Review of Systems  Constitutional, HEENT, cardiovascular, pulmonary, GI, , musculoskeletal, neuro, skin, endocrine and psych systems are negative, except as otherwise noted.      Objective           Vitals:  No vitals were obtained today due to virtual visit.    Physical Exam   GENERAL: alert and no distress  EYES: Eyes grossly normal to inspection.  No discharge or erythema, or obvious scleral/conjunctival abnormalities.  RESP: No audible wheeze, cough, or visible cyanosis.    SKIN: Visible skin clear. No significant rash, abnormal pigmentation or lesions.  NEURO: " Cranial nerves grossly intact.  Mentation and speech appropriate for age.  PSYCH: Appropriate affect, tone, and pace of words    See orders      Video-Visit Details    Type of service:  Video Visit   Originating Location (pt. Location): Home    Distant Location (provider location):  On-site  Platform used for Video Visit: Amol  Signed Electronically by: NEVAEH BARNEY

## 2024-12-21 NOTE — TELEPHONE ENCOUNTER
PA Initiation    Medication: NURTEC 75 MG PO TBDP  Insurance Company: Jim - Phone [phone] Fax [phone]  Pharmacy Filling the Rx: [id number] - [place], [place] - [address]  Filling Pharmacy Phone: [phone]  Filling Pharmacy Fax:    Start Date: 12/21/2024

## 2024-12-22 NOTE — TELEPHONE ENCOUNTER
Prior Authorization Approval    Medication: NURTEC 75 MG PO TBDP  Authorization Effective Date: 12/21/2024  Authorization Expiration Date: 12/21/2025  Approved Dose/Quantity: 8/30  Reference #: [id number]   Insurance Company: Jim - Phone [phone] Fax [phone]  Expected CoPay: $    CoPay Card Available:      Financial Assistance Needed: =  Which Pharmacy is filling the prescription: [id number] [place] - [address]  Pharmacy Notified: Yes  Patient Notified: Yes

## 2025-01-06 ENCOUNTER — LAB (OUTPATIENT)
Dept: LAB | Facility: CLINIC | Age: 30
End: 2025-01-06
Payer: COMMERCIAL

## 2025-01-06 DIAGNOSIS — N92.6 IRREGULAR MENSES: ICD-10-CM

## 2025-01-06 DIAGNOSIS — Z13.29 SCREENING FOR THYROID DISORDER: ICD-10-CM

## 2025-01-06 DIAGNOSIS — Z31.41 FERTILITY TESTING: ICD-10-CM

## 2025-01-06 DIAGNOSIS — R53.83 FATIGUE, UNSPECIFIED TYPE: ICD-10-CM

## 2025-01-06 DIAGNOSIS — Z13.220 SCREENING CHOLESTEROL LEVEL: ICD-10-CM

## 2025-01-06 DIAGNOSIS — G62.9 NEUROPATHY: ICD-10-CM

## 2025-01-06 DIAGNOSIS — Z13.1 SCREENING FOR DIABETES MELLITUS: ICD-10-CM

## 2025-01-06 LAB
BASOPHILS # BLD AUTO: 0 10E3/UL (ref 0–0.2)
BASOPHILS NFR BLD AUTO: 0 %
EOSINOPHIL # BLD AUTO: 0 10E3/UL (ref 0–0.7)
EOSINOPHIL NFR BLD AUTO: 0 %
ERYTHROCYTE [DISTWIDTH] IN BLOOD BY AUTOMATED COUNT: 13.6 % (ref 10–15)
EST. AVERAGE GLUCOSE BLD GHB EST-MCNC: 97 MG/DL
HBA1C MFR BLD: 5 % (ref 0–5.6)
HCT VFR BLD AUTO: 41.2 % (ref 35–47)
HGB BLD-MCNC: 13.4 G/DL (ref 11.7–15.7)
IMM GRANULOCYTES # BLD: 0 10E3/UL
IMM GRANULOCYTES NFR BLD: 0 %
LYMPHOCYTES # BLD AUTO: 1.6 10E3/UL (ref 0.8–5.3)
LYMPHOCYTES NFR BLD AUTO: 17 %
MCH RBC QN AUTO: 30.7 PG (ref 26.5–33)
MCHC RBC AUTO-ENTMCNC: 32.5 G/DL (ref 31.5–36.5)
MCV RBC AUTO: 95 FL (ref 78–100)
MONOCYTES # BLD AUTO: 0.6 10E3/UL (ref 0–1.3)
MONOCYTES NFR BLD AUTO: 7 %
NEUTROPHILS # BLD AUTO: 6.9 10E3/UL (ref 1.6–8.3)
NEUTROPHILS NFR BLD AUTO: 75 %
PLATELET # BLD AUTO: 320 10E3/UL (ref 150–450)
RBC # BLD AUTO: 4.36 10E6/UL (ref 3.8–5.2)
WBC # BLD AUTO: 9.2 10E3/UL (ref 4–11)

## 2025-01-06 PROCEDURE — 84443 ASSAY THYROID STIM HORMONE: CPT

## 2025-01-06 PROCEDURE — 83001 ASSAY OF GONADOTROPIN (FSH): CPT

## 2025-01-06 PROCEDURE — 83550 IRON BINDING TEST: CPT

## 2025-01-06 PROCEDURE — 80061 LIPID PANEL: CPT

## 2025-01-06 PROCEDURE — 85025 COMPLETE CBC W/AUTO DIFF WBC: CPT

## 2025-01-06 PROCEDURE — 82533 TOTAL CORTISOL: CPT

## 2025-01-06 PROCEDURE — 84403 ASSAY OF TOTAL TESTOSTERONE: CPT

## 2025-01-06 PROCEDURE — 84270 ASSAY OF SEX HORMONE GLOBUL: CPT

## 2025-01-06 PROCEDURE — 84146 ASSAY OF PROLACTIN: CPT

## 2025-01-06 PROCEDURE — 82166 ASSAY ANTI-MULLERIAN HORM: CPT

## 2025-01-06 PROCEDURE — 36415 COLL VENOUS BLD VENIPUNCTURE: CPT

## 2025-01-06 PROCEDURE — 80053 COMPREHEN METABOLIC PANEL: CPT

## 2025-01-06 PROCEDURE — 82728 ASSAY OF FERRITIN: CPT

## 2025-01-06 PROCEDURE — 83540 ASSAY OF IRON: CPT

## 2025-01-06 PROCEDURE — 83002 ASSAY OF GONADOTROPIN (LH): CPT

## 2025-01-06 PROCEDURE — 82670 ASSAY OF TOTAL ESTRADIOL: CPT

## 2025-01-06 PROCEDURE — 86376 MICROSOMAL ANTIBODY EACH: CPT

## 2025-01-06 PROCEDURE — 82607 VITAMIN B-12: CPT

## 2025-01-06 PROCEDURE — 82306 VITAMIN D 25 HYDROXY: CPT

## 2025-01-06 PROCEDURE — 83036 HEMOGLOBIN GLYCOSYLATED A1C: CPT

## 2025-01-07 LAB
ALBUMIN SERPL BCG-MCNC: 4.8 G/DL (ref 3.5–5.2)
ALP SERPL-CCNC: 55 U/L (ref 40–150)
ALT SERPL W P-5'-P-CCNC: 18 U/L (ref 0–50)
ANION GAP SERPL CALCULATED.3IONS-SCNC: 13 MMOL/L (ref 7–15)
AST SERPL W P-5'-P-CCNC: 20 U/L (ref 0–45)
BILIRUB SERPL-MCNC: 0.2 MG/DL
BUN SERPL-MCNC: 10.6 MG/DL (ref 6–20)
CALCIUM SERPL-MCNC: 9.6 MG/DL (ref 8.8–10.4)
CHLORIDE SERPL-SCNC: 102 MMOL/L (ref 98–107)
CHOLEST SERPL-MCNC: 187 MG/DL
CORTIS SERPL-MCNC: 7.4 UG/DL
CREAT SERPL-MCNC: 0.6 MG/DL (ref 0.51–0.95)
EGFRCR SERPLBLD CKD-EPI 2021: >90 ML/MIN/1.73M2
ESTRADIOL SERPL-MCNC: 83 PG/ML
FASTING STATUS PATIENT QL REPORTED: NO
FERRITIN SERPL-MCNC: 33 NG/ML (ref 6–175)
FSH SERPL IRP2-ACNC: 2.7 MIU/ML
GLUCOSE SERPL-MCNC: 94 MG/DL (ref 70–99)
HCO3 SERPL-SCNC: 24 MMOL/L (ref 22–29)
HDLC SERPL-MCNC: 81 MG/DL
IRON BINDING CAPACITY (ROCHE): 362 UG/DL (ref 240–430)
IRON SATN MFR SERPL: 15 % (ref 15–46)
IRON SERPL-MCNC: 55 UG/DL (ref 37–145)
LDLC SERPL CALC-MCNC: 87 MG/DL
LH SERPL-ACNC: 8.1 MIU/ML
MIS SERPL-MCNC: 12.1 NG/ML (ref 0.89–9.9)
NONHDLC SERPL-MCNC: 106 MG/DL
POTASSIUM SERPL-SCNC: 3.9 MMOL/L (ref 3.4–5.3)
PROLACTIN SERPL 3RD IS-MCNC: 14 NG/ML (ref 5–23)
PROT SERPL-MCNC: 7.6 G/DL (ref 6.4–8.3)
SHBG SERPL-SCNC: 54 NMOL/L (ref 30–135)
SODIUM SERPL-SCNC: 139 MMOL/L (ref 135–145)
TRIGL SERPL-MCNC: 97 MG/DL
TSH SERPL DL<=0.005 MIU/L-ACNC: 1.07 UIU/ML (ref 0.3–4.2)
VIT B12 SERPL-MCNC: 787 PG/ML (ref 232–1245)
VIT D+METAB SERPL-MCNC: 28 NG/ML (ref 20–50)

## 2025-01-08 ENCOUNTER — OFFICE VISIT (OUTPATIENT)
Dept: NEUROLOGY | Facility: CLINIC | Age: 30
End: 2025-01-08
Attending: NURSE PRACTITIONER
Payer: COMMERCIAL

## 2025-01-08 VITALS
BODY MASS INDEX: 16.25 KG/M2 | HEART RATE: 83 BPM | OXYGEN SATURATION: 100 % | HEIGHT: 66 IN | WEIGHT: 101.1 LBS | SYSTOLIC BLOOD PRESSURE: 112 MMHG | DIASTOLIC BLOOD PRESSURE: 67 MMHG

## 2025-01-08 DIAGNOSIS — M26.609 TMJ DYSFUNCTION: ICD-10-CM

## 2025-01-08 DIAGNOSIS — M54.2 NECK PAIN: ICD-10-CM

## 2025-01-08 DIAGNOSIS — G43.009 MIGRAINE WITHOUT AURA AND WITHOUT STATUS MIGRAINOSUS, NOT INTRACTABLE: Primary | ICD-10-CM

## 2025-01-08 LAB
TESTOST FREE SERPL-MCNC: 0.42 NG/DL
TESTOST SERPL-MCNC: 32 NG/DL (ref 8–60)
THYROPEROXIDASE AB SERPL-ACNC: <10 IU/ML

## 2025-01-08 RX ORDER — RIBOFLAVIN (VITAMIN B2) 400 MG
400 TABLET ORAL DAILY
Qty: 30 TABLET | Refills: 5 | Status: SHIPPED | OUTPATIENT
Start: 2025-01-08

## 2025-01-08 RX ORDER — MAGNESIUM OXIDE 400 MG/1
400 TABLET ORAL DAILY
Qty: 30 TABLET | Refills: 5 | Status: SHIPPED | OUTPATIENT
Start: 2025-01-08

## 2025-01-08 NOTE — PROGRESS NOTES
Neurology Consultation    Patient Name:  Maylin Cutler  MRN:  [id number]    :  1995  Date of Service:  2025  Primary care provider:  Ashanti Peoples        Chief Complaint: Headaches     History of Present Illness:     Maylin Cutler is a 29 year old female who presents for evaluation of headaches.     - History: Started getting migraines around age 15-16 (prior to menarche).  They have gotten progressive worse over the last several years. Has tried several medications that initially work but than seem to loose effect.   - Location: Starts in the neck that radiates up and wraps around to the front.  Can start retro-orbitally   - Quality: Pressure, throbbing, pounding   - Duration: ~ 1 day   - Frequency: About 1/week, 3-4 migraine days/month   - Associated symptoms: Has associated photophobia, phonophobia, nausea, and vomiting.  Has dizziness.  Gradual onset, denies aura.  Maybe a little blurriness but largely denies vision changes including loss of vision or diplopia.  No significant change with position.  Denies focal weakness associated.  Worse with activity/movement.  Has some lacrimation ipsilateral otherwise denies autonomic symptoms. Some jaw pain, some clicking.  Does clench and grind teeth.    - Alleviating Factors: Dark room, sleep  - Triggers: Menstrual cycle, positioning her neck   - Birth Control/Menses: Worse around menstrual cycle and more likely to get them around her menstrual cycle.  Not on birth control.  No active family planning, not using protection.   - Co-morbidities: ADHD, ESTEBAN > depression, panic attacks. Xanax PRN, Gabapentin, Adderall, restarting therapy   - Risk Factors: Mom has migraines.  Had a concussion in   - Prior Workup: Has not seen a neurologist before. CT head  mild left frontal scalp swelling, no acute intracranial abnormality.     - Headache hygiene: Denies difficulty with sleep.  Denies regular caffeine intake.  Tries to stay hydrated.   "Denies regular use of OTC pain relievers.      - Prior Medications: APAP (no benefit), NSAIDs (no benefit), rizatriptan (helped initially), eletriptan (helped initially), zolmitriptan (initially helped), Fioricet (minimal relief). Has not been on preventative   - Current Medications: Nurtec (just prescribed, picking up today), Gabapentin  - Prior Treatment: Denies   - Current Treatment: Denies         Past Medical History:  - ADHD, generalized anxiety disorder, depression, panic attacks, lumbar radiculopathy, and migraines     Social History:  - Does lash extension, hair cutting.  Denies tobacco use, smokes a little marijuana before bed, drinks 3 alcoholic drinks/week. Single, no children.     Allergies:  No Known Allergies    Physical Exam:  /67   Pulse 83   Ht 1.666 m (5' 5.59\")   Wt 45.9 kg (101 lb 1.6 oz)   SpO2 100%   BMI 16.52 kg/m      General:  No acute distress  Head and Neck: Tender to palpation in the paraspinals   Cardiovascular: Normal rate.  Lung: Respirations are non-labored.  Extremities: Normal range of motion  Integumentary: Warm and dry    Neurologic:  Mental status : alert, fund of knowledge appropriate for visit    LANGUAGE: Speech fluent, no dysarthria     CN:  II- pupils equal and reactive, visual fields full, no optic disc edema on funduscopic exam   III, IV, VI- extraocular movements intact  V- sensation intact bilaterally  VII- face symmetric  VIII- hearing intact, no nystagmus  IX, X- palate elevates symmetrically  XI- sternocleidomastoid 5/5  XII- tongue midline    MOTOR : intact bulk   5/5 strength in all ext    SENSORY : intact to light touch, temp, and vibration in BUE and BLE     REFLEXES:       Right Left   Triceps 2 2   Biceps 2 2   Brachioradialis 2 2   Knee jerk 2+ 2+   Ankle jerk 2+ 2+   Dexter absent   Toes downgoing     CEREBELLUM: finger to nose, finger taps, and heel to shin intact bilaterally     GAIT : normal t     Cognition and Speech: Speech clear and " coherent.    Psychiatric: Cooperative, Appropriate mood & affect.     Assessment/Plan:   Maylin Cutler is a 29 year old female who presents for evaluation of headaches. Presentation is consistent with migraine without aura although think there is a cervicogenic component as well as TMJ dysfunction contributing.  Discussed treatment pathways for migraines including starting a preventative. Discussed trying migraine supplements or propranolol. She wanted to proceed with migraine supplements.  She has been prescribed Nurtec for abortive therapy and picking it up today.  Will refer to PT for neck tension and TMJ dysfunction. Also discussed seeing a dentist for possible mouth guard for bruxism and clenching.  She is also not on birth control or using protection; discussed the importance of using either if she is not actively trying to get pregnant.     Migraine without aura   Neck pain/tension  TMJ dysfunction     Plan  > Start magnesium and B2 (riboflavin) 400 mg daily   > Nurtec PRN  > Consider propranolol for preventative if needed   > Refer to PT    I spent 54 minutes providing care for this patient, including reviewing imaging, labs, and notes as well as providing counseling and coordination of care for the above issues.

## 2025-01-08 NOTE — LETTER
2025      Maylin Cutler  [address] [place] [address]      Dear Colleague,    Thank you for referring your patient, Maylin Cutler, to the [facility]. Please see a copy of my visit note below.      Neurology Consultation    Patient Name:  Maylin Cutler  MRN:  [id number]    :  1995  Date of Service:  2025  Primary care provider:  Ashanti Peoples        Chief Complaint: Headaches     History of Present Illness:     Maylin Cutler is a 29 year old female who presents for evaluation of headaches.     - History: Started getting migraines around age 15-17 (prior to menarche).  They have gotten progressive worse over the last several years. Has tried several medications that initially work but than seem to loose effect.   - Location: Starts in the neck that radiates up and wraps around to the front.  Can start retro-orbitally   - Quality: Pressure, throbbing, pounding   - Duration: ~ 1 day   - Frequency: About 1/week, 3-4 migraine days/month   - Associated symptoms: Has associated photophobia, phonophobia, nausea, and vomiting.  Has dizziness.  Gradual onset, denies aura.  Maybe a little blurriness but largely denies vision changes including loss of vision or diplopia.  No significant change with position.  Denies focal weakness associated.  Worse with activity/movement.  Has some lacrimation ipsilateral otherwise denies autonomic symptoms. Some jaw pain, some clicking.  Does clench and grind teeth.    - Alleviating Factors: Dark room, sleep  - Triggers: Menstrual cycle, positioning her neck   - Birth Control/Menses: Worse around menstrual cycle and more likely to get them around her menstrual cycle.  Not on birth control.  No active family planning, not using protection.   - Co-morbidities: ADHD, ESTEBAN > depression, panic attacks. Xanax PRN, Gabapentin, Adderall, restarting therapy   - Risk Factors: Mom has migraines.  Had a concussion in   -  "Prior Workup: Has not seen a neurologist before. CT head 2020 mild left frontal scalp swelling, no acute intracranial abnormality.     - Headache hygiene: Denies difficulty with sleep.  Denies regular caffeine intake.  Tries to stay hydrated.  Denies regular use of OTC pain relievers.      - Prior Medications: APAP (no benefit), NSAIDs (no benefit), rizatriptan (helped initially), eletriptan (helped initially), zolmitriptan (initially helped), Fioricet (minimal relief). Has not been on preventative   - Current Medications: Nurtec (just prescribed, picking up today), Gabapentin  - Prior Treatment: Denies   - Current Treatment: Denies         Past Medical History:  - ADHD, generalized anxiety disorder, depression, panic attacks, lumbar radiculopathy, and migraines     Social History:  - Does lash extension, hair cutting.  Denies tobacco use, smokes a little marijuana before bed, drinks 3 alcoholic drinks/week. Single, no children.     Allergies:  No Known Allergies    Physical Exam:  /67   Pulse 83   Ht 1.666 m (5' 5.59\")   Wt 45.9 kg (101 lb 1.6 oz)   SpO2 100%   BMI 16.52 kg/m      General:  No acute distress  Head and Neck: Tender to palpation in the paraspinals   Cardiovascular: Normal rate.  Lung: Respirations are non-labored.  Extremities: Normal range of motion  Integumentary: Warm and dry    Neurologic:  Mental status : alert, fund of knowledge appropriate for visit    LANGUAGE: Speech fluent, no dysarthria     CN:  II- pupils equal and reactive, visual fields full, no optic disc edema on funduscopic exam   III, IV, VI- extraocular movements intact  V- sensation intact bilaterally  VII- face symmetric  VIII- hearing intact, no nystagmus  IX, X- palate elevates symmetrically  XI- sternocleidomastoid 5/5  XII- tongue midline    MOTOR : intact bulk   5/5 strength in all ext    SENSORY : intact to light touch, temp, and vibration in BUE and BLE     REFLEXES:       Right Left   Triceps 2 2   Biceps 2 2 "   Brachioradialis 2 2   Knee jerk 2+ 2+   Ankle jerk 2+ 2+   Dexter absent   Toes downgoing     CEREBELLUM: finger to nose, finger taps, and heel to shin intact bilaterally     GAIT : normal t     Cognition and Speech: Speech clear and coherent.    Psychiatric: Cooperative, Appropriate mood & affect.     Assessment/Plan:   Maylin Cutler is a 29 year old female who presents for evaluation of headaches. Presentation is consistent with migraine without aura although think there is a cervicogenic component as well as TMJ dysfunction contributing.  Discussed treatment pathways for migraines including starting a preventative. Discussed trying migraine supplements or propranolol. She wanted to proceed with migraine supplements.  She has been prescribed Nurtec for abortive therapy and picking it up today.  Will refer to PT for neck tension and TMJ dysfunction. Also discussed seeing a dentist for possible mouth guard for bruxism and clenching.  She is also not on birth control or using protection; discussed the importance of using either if she is not actively trying to get pregnant.     Migraine without aura   Neck pain/tension  TMJ dysfunction     Plan  > Start magnesium and B2 (riboflavin) 400 mg daily   > Nurtec PRN  > Consider propranolol for preventative if needed   > Refer to PT    I spent 54 minutes providing care for this patient, including reviewing imaging, labs, and notes as well as providing counseling and coordination of care for the above issues.      Again, thank you for allowing me to participate in the care of your patient.        Sincerely,        Gabby Jimenez, DO    Electronically signed

## 2025-01-08 NOTE — RESULT ENCOUNTER NOTE
Hi April,    Thank you for your recent office visit.    Here are your recent results.  All labs are normal except elevated antimüllerian hormone which can be from PCOS.  Please schedule with the OB/GYN to discuss your fertility concerns if applicable    Feel free to contact me via Lumier or call the clinic at [phone].    Sincerely,    ANDREA Wall, FNP-BC

## 2025-01-20 ENCOUNTER — RX ONLY (RX ONLY)
Age: 30
End: 2025-01-20

## 2025-01-20 ENCOUNTER — OFFICE VISIT (OUTPATIENT)
Dept: ORTHOPEDICS | Facility: CLINIC | Age: 30
End: 2025-01-20
Payer: COMMERCIAL

## 2025-01-20 ENCOUNTER — OFFICE VISIT (OUTPATIENT)
Dept: BEHAVIORAL HEALTH | Facility: CLINIC | Age: 30
End: 2025-01-20
Payer: COMMERCIAL

## 2025-01-20 DIAGNOSIS — F41.0 PANIC DISORDER WITHOUT AGORAPHOBIA: Primary | ICD-10-CM

## 2025-01-20 DIAGNOSIS — M54.50 RECURRENT LOW BACK PAIN: ICD-10-CM

## 2025-01-20 DIAGNOSIS — F33.1 MAJOR DEPRESSIVE DISORDER, RECURRENT EPISODE, MODERATE (H): ICD-10-CM

## 2025-01-20 DIAGNOSIS — M54.16 LUMBAR RADICULOPATHY: Primary | ICD-10-CM

## 2025-01-20 PROCEDURE — 90791 PSYCH DIAGNOSTIC EVALUATION: CPT | Mod: 52

## 2025-01-20 PROCEDURE — 99213 OFFICE O/P EST LOW 20 MIN: CPT | Performed by: PHYSICIAN ASSISTANT

## 2025-01-20 RX ORDER — TRETINOIN 0.1 MG/G
0.01% GEL TOPICAL QD
Qty: 45 | Refills: 2 | Status: ERX

## 2025-01-20 ASSESSMENT — ANXIETY QUESTIONNAIRES
IF YOU CHECKED OFF ANY PROBLEMS ON THIS QUESTIONNAIRE, HOW DIFFICULT HAVE THESE PROBLEMS MADE IT FOR YOU TO DO YOUR WORK, TAKE CARE OF THINGS AT HOME, OR GET ALONG WITH OTHER PEOPLE: SOMEWHAT DIFFICULT
8. IF YOU CHECKED OFF ANY PROBLEMS, HOW DIFFICULT HAVE THESE MADE IT FOR YOU TO DO YOUR WORK, TAKE CARE OF THINGS AT HOME, OR GET ALONG WITH OTHER PEOPLE?: SOMEWHAT DIFFICULT
3. WORRYING TOO MUCH ABOUT DIFFERENT THINGS: MORE THAN HALF THE DAYS
GAD7 TOTAL SCORE: 14
4. TROUBLE RELAXING: MORE THAN HALF THE DAYS
1. FEELING NERVOUS, ANXIOUS, OR ON EDGE: MORE THAN HALF THE DAYS
GAD7 TOTAL SCORE: 14
2. NOT BEING ABLE TO STOP OR CONTROL WORRYING: MORE THAN HALF THE DAYS
7. FEELING AFRAID AS IF SOMETHING AWFUL MIGHT HAPPEN: NEARLY EVERY DAY
7. FEELING AFRAID AS IF SOMETHING AWFUL MIGHT HAPPEN: NEARLY EVERY DAY
5. BEING SO RESTLESS THAT IT IS HARD TO SIT STILL: SEVERAL DAYS
6. BECOMING EASILY ANNOYED OR IRRITABLE: MORE THAN HALF THE DAYS
GAD7 TOTAL SCORE: 14

## 2025-01-20 ASSESSMENT — PATIENT HEALTH QUESTIONNAIRE - PHQ9
SUM OF ALL RESPONSES TO PHQ QUESTIONS 1-9: 8
SUM OF ALL RESPONSES TO PHQ QUESTIONS 1-9: 8
10. IF YOU CHECKED OFF ANY PROBLEMS, HOW DIFFICULT HAVE THESE PROBLEMS MADE IT FOR YOU TO DO YOUR WORK, TAKE CARE OF THINGS AT HOME, OR GET ALONG WITH OTHER PEOPLE: SOMEWHAT DIFFICULT

## 2025-01-20 NOTE — NURSING NOTE
Reason For Visit:   Chief Complaint   Patient presents with    RECHECK     1 month follow-up lumbar radiculopathy       Primary MD: Ashanti Peoples  Ref. MD: EST    ?  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     Date of injury: No  Type of injury: No.     Date of surgery: No  Type of surgery: No.     Smoker: No  Request smoking cessation information: No    There were no vitals taken for this visit.    Pain Assessment  Patient Currently in Pain: Yes  0-10 Pain Scale: 4  Primary Pain Location: Back    Oswestry (OLIVER) Questionnaire        1/20/2025    12:38 PM   OSWESTRY DISABILITY INDEX   Count 10    Sum 9    Oswestry Score (%) 18 %        Patient-reported      Visual Analog Pain Scale  Back Pain Scale 0-10: 4  Right leg pain: 0  Left leg pain: 0 (some numbness down leg)  Neck Pain Scale 0-10: 0  Right arm pain: 0  Left arm pain: 0    Promis 10 Assessment        1/20/2025    12:40 PM   PROMIS 10   In general, would you say your health is: Very good   In general, would you say your quality of life is: Very good   In general, how would you rate your physical health? Very good   In general, how would you rate your mental health, including your mood and your ability to think? Very good   In general, how would you rate your satisfaction with your social activities and relationships? Very good   In general, please rate how well you carry out your usual social activities and roles Good   To what extent are you able to carry out your everyday physical activities such as walking, climbing stairs, carrying groceries, or moving a chair? Moderately   In the past 7 days, how often have you been bothered by emotional problems such as feeling anxious, depressed, or irritable? Sometimes   In the past 7 days, how would you rate your fatigue on average? None   In the past 7 days, how would you rate your pain on average, where 0 means no pain, and 10 means worst imaginable pain? 4    In general, would you say your health is: 4   In general, would you say your quality of life is: 4   In general, how would you rate your physical health? 4   In general, how would you rate your mental health, including your mood and your ability to think? 4   In general, how would you rate your satisfaction with your social activities and relationships? 4   In general, please rate how well you carry out your usual social activities and roles. (This includes activities at home, at work and in your community, and responsibilities as a parent, child, spouse, employee, friend, etc.) 3   To what extent are you able to carry out your everyday physical activities such as walking, climbing stairs, carrying groceries, or moving a chair? 3   In the past 7 days, how often have you been bothered by emotional problems such as feeling anxious, depressed, or irritable? 3   In the past 7 days, how would you rate your fatigue on average? 1   In the past 7 days, how would you rate your pain on average, where 0 means no pain, and 10 means worst imaginable pain? 4   Global Mental Health Score 15    Global Physical Health Score 15    PROMIS TOTAL - SUBSCORES 30        Patient-reported                Ashanti Connor LPN

## 2025-01-20 NOTE — PROGRESS NOTES
Spine Surgery Return Clinic Visit    Chief Complaint:   RECHECK (1 month follow-up lumbar radiculopathy)    Interval HPI:     April R He is a 30 year old female who presents today for symptom relief after injection    Notes 77% improved. She feels like the numbness to her left knee has improved.  Still limited with right-sided back pain. She notes the left she had improvement of her right side she would be about 100% improved with symptoms.  She has not started physical therapy.  Still taking gabapentin 600 mg 3 times a day.  Additionally she notes neck pain that radiates into the right elbow and down the forearm.  She notes right shoulder pain as well.  She believes that her neck pain causes her migraines.  Has recently been referred to physical therapy with neurology evaluation.         Past Medical History:   No past medical history on file.         Past Surgical History:     Past Surgical History:   Procedure Laterality Date    ENT SURGERY              Social History:     Social History     Tobacco Use    Smoking status: Never     Passive exposure: Never    Smokeless tobacco: Never   Substance Use Topics    Alcohol use: Yes     Comment: Occ            Family History:     Family History   Problem Relation Age of Onset    No Known Problems Mother     No Known Problems Father     No Known Problems Brother     No Known Problems Sister             Allergies:   No Known Allergies         Medications:     Current Outpatient Medications   Medication Sig Dispense Refill    ALPRAZolam (XANAX) 1 MG tablet Take 1 tablet (1 mg) by mouth 2 times daily as needed (for panic attack only-use sparingly). 30 tablet 2    amphetamine-dextroamphetamine (ADDERALL XR) 30 MG 24 hr capsule Take 1 capsule (30 mg) by mouth daily. 30 capsule 0    [START ON 2/17/2025] amphetamine-dextroamphetamine (ADDERALL XR) 30 MG 24 hr capsule Take 1 capsule (30 mg) by mouth daily. 30 capsule 0    benzoyl peroxide (ACNE-CLEAR) 10 % external gel Apply  topically daily 42.5 g 2    clindamycin (CLEOCIN-T) 1 % external gel APPLY TO AFFECTED AREA TWICE A DAY 60 g 2    clindamycin (CLEOCIN-T) 1 % external gel Apply topically 2 times daily 30 g 2    cyclobenzaprine (FLEXERIL) 10 MG tablet TAKE 1 TABLET (10 MG) BY MOUTH 2 TIMES DAILY AS NEEDED FOR MUSCLE SPASMS 60 tablet 3    fluticasone (FLONASE) 50 MCG/ACT nasal spray Spray 1 spray into both nostrils daily 16 g 4    gabapentin (NEURONTIN) 300 MG capsule Take 2 capsules (600 mg) by mouth 3 times daily as needed for neuropathic pain. 270 capsule 4    magnesium oxide (MAG-OX) 400 MG tablet Take 1 tablet (400 mg) by mouth daily. 30 tablet 5    Riboflavin 400 MG TABS Take 400 mg by mouth daily. 30 tablet 5    rimegepant (NURTEC) 75 MG ODT tablet Place 1 tablet (75 mg) under the tongue daily as needed for migraine. Maximum of 1 tablet every 24 hours. 8 tablet 1    Salicylic Acid 2 % LIQD Externally apply topically daily. 325 mL 3    tretinoin (RETIN-A) 0.025 % external gel Apply topically at bedtime 45 g 2     No current facility-administered medications for this visit.             Physical Exam:   Vitals: There were no vitals taken for this visit.  Constitutional: Patient is healthy, well-nourished and appears stated age.  Respiratory: Patient is breathing normally and in no respiratory distress.  Skin: No suspicious rashes or lesions  Gait: Non-antalgic gait without use of assistive devices. Able to tandem gait without difficulty.   Neurologic - Sensation intact to light touch bilaterally. Romberg negative. Deep tendon reflexes 2+ bilateral patella and achilles.    Musculoskeletal: Strength: 5/5 bilateral HF KE DF. 5/5 left dorsiflexion.   Spine: overall good sagittal balance     Lumbosacral Spine:  Appearance - Normal lordosis  Palpation -tender over paraspinal muscles  ROM - Full, no pain       Hip: No pain with hip log roll and no tenderness over the greater trochanters. Howard negative.          Imaging:   I  independently reviewed and interpreted the following imaging at this clinic visit which were also reviewed with patient    MR lumbar 7/15/2024      L4-5 degenerative disc disease that results in left paracentral disc protrusion with left lateral recess and foraminal stenosis.  L4-5 facet fluid sign.  L5-S1 degenerative disc disease with circumferential disc bulge that results in mild foraminal stenosis.  Facet with fluid sign noted at L5-S1.     XR lumbar 8/19/2020  PI 33, LL 62, L4-S1 LL 32, PT 3.7.  no Fractures.  No subluxation with flexion extension, right thoracolumbar scoliosis 22 degrees.      Assessment:     30 year old female with     Lumbar disc herniation with radiculopathy  L4-5 lateral recess stenosis with bilateral L5 nerve impingement  Chronic low back pain with left sided radiculopathy   Adolescent idiopathic scoliosis, with right thoracolumbar scoliosis 22 degrees.     Her MRI shows bilateral L5 stenosis at L5-S1 due to the large disc herniation.  Educated that we sent her for a left L5-S1 TFESI due to her pain primarily being on the left leg.  When symptoms return we will plan on sending her for a bilateral L5-S1 TFESI.  No weakness noted on today exam.  Educated to continue conservative management.  She also notes neck pain that radiates into the right arm.  Educated to work with physical therapy and if symptoms do not improve we will plan on getting an MRI of her C-spine.  Plan to return to clinic in 6 months for evaluation to rule out progressive weakness.     Plan:     Return to clinic in 6 months  May repeat with a bilateral transforaminal epidural steroid injection at L5-S1     Plan formulated with Dr. Pedroza who also saw the patient and reviewed imaging. We used the patient's imaging and models to explain their pathophysiology and treatment options. All the patient's questions were answered to their satisfaction.     Thank you for allowing me to be a part of this patient's care.       KAM Valadez PA-C   Orthopedic Spine Surgery

## 2025-01-20 NOTE — LETTER
1/20/2025      Maylin Cutler  [address] [place] [facility] [address]      Dear Colleague,    Thank you for referring your patient, Maylin Cutler, to the [facility]. Please see a copy of my visit note below.    Spine Surgery Return Clinic Visit    Chief Complaint:   RECHECK (1 month follow-up lumbar radiculopathy)    Interval HPI:     Maylin Cutler is a 30 year old female who presents today for symptom relief after injection    Notes 77% improved. She feels like the numbness to her left knee has improved.  Still limited with right-sided back pain. She notes the left she had improvement of her right side she would be about 100% improved with symptoms.  She has not started physical therapy.  Still taking gabapentin 600 mg 3 times a day.  Additionally she notes neck pain that radiates into the right elbow and down the forearm.  She notes right shoulder pain as well.  She believes that her neck pain causes her migraines.  Has recently been referred to physical therapy with neurology evaluation.         Past Medical History:   No past medical history on file.         Past Surgical History:     Past Surgical History:   Procedure Laterality Date     ENT SURGERY              Social History:     Social History     Tobacco Use     Smoking status: Never     Passive exposure: Never     Smokeless tobacco: Never   Substance Use Topics     Alcohol use: Yes     Comment: Occ            Family History:     Family History   Problem Relation Age of Onset     No Known Problems Mother      No Known Problems Father      No Known Problems Brother      No Known Problems Sister             Allergies:   No Known Allergies         Medications:     Current Outpatient Medications   Medication Sig Dispense Refill     ALPRAZolam (XANAX) 1 MG tablet Take 1 tablet (1 mg) by mouth 2 times daily as needed (for panic attack only-use sparingly). 30 tablet 2     amphetamine-dextroamphetamine (ADDERALL XR) 30 MG 24 hr capsule  Take 1 capsule (30 mg) by mouth daily. 30 capsule 0     [START ON 2/17/2025] amphetamine-dextroamphetamine (ADDERALL XR) 30 MG 24 hr capsule Take 1 capsule (30 mg) by mouth daily. 30 capsule 0     benzoyl peroxide (ACNE-CLEAR) 10 % external gel Apply topically daily 42.5 g 2     clindamycin (CLEOCIN-T) 1 % external gel APPLY TO AFFECTED AREA TWICE A DAY 60 g 2     clindamycin (CLEOCIN-T) 1 % external gel Apply topically 2 times daily 30 g 2     cyclobenzaprine (FLEXERIL) 10 MG tablet TAKE 1 TABLET (10 MG) BY MOUTH 2 TIMES DAILY AS NEEDED FOR MUSCLE SPASMS 60 tablet 3     fluticasone (FLONASE) 50 MCG/ACT nasal spray Spray 1 spray into both nostrils daily 16 g 4     gabapentin (NEURONTIN) 300 MG capsule Take 2 capsules (600 mg) by mouth 3 times daily as needed for neuropathic pain. 270 capsule 4     magnesium oxide (MAG-OX) 400 MG tablet Take 1 tablet (400 mg) by mouth daily. 30 tablet 5     Riboflavin 400 MG TABS Take 400 mg by mouth daily. 30 tablet 5     rimegepant (NURTEC) 75 MG ODT tablet Place 1 tablet (75 mg) under the tongue daily as needed for migraine. Maximum of 1 tablet every 24 hours. 8 tablet 1     Salicylic Acid 2 % LIQD Externally apply topically daily. 325 mL 3     tretinoin (RETIN-A) 0.025 % external gel Apply topically at bedtime 45 g 2     No current facility-administered medications for this visit.             Physical Exam:   Vitals: There were no vitals taken for this visit.  Constitutional: Patient is healthy, well-nourished and appears stated age.  Respiratory: Patient is breathing normally and in no respiratory distress.  Skin: No suspicious rashes or lesions  Gait: Non-antalgic gait without use of assistive devices. Able to tandem gait without difficulty.   Neurologic - Sensation intact to light touch bilaterally. Romberg negative. Deep tendon reflexes 2+ bilateral patella and achilles.    Musculoskeletal: Strength: 5/5 bilateral HF KE DF. 5/5 left dorsiflexion.   Spine: overall good  sagittal balance     Lumbosacral Spine:  Appearance - Normal lordosis  Palpation -tender over paraspinal muscles  ROM - Full, no pain       Hip: No pain with hip log roll and no tenderness over the greater trochanters. Howard negative.          Imaging:   I independently reviewed and interpreted the following imaging at this clinic visit which were also reviewed with patient    MR lumbar 7/15/2024      L4-5 degenerative disc disease that results in left paracentral disc protrusion with left lateral recess and foraminal stenosis.  L4-5 facet fluid sign.  L5-S1 degenerative disc disease with circumferential disc bulge that results in mild foraminal stenosis.  Facet with fluid sign noted at L5-S1.     XR lumbar 8/19/2020  PI 33, LL 62, L4-S1 LL 32, PT 3.7.  no Fractures.  No subluxation with flexion extension, right thoracolumbar scoliosis 22 degrees.      Assessment:     30 year old female with     Lumbar disc herniation with radiculopathy  L4-5 lateral recess stenosis with bilateral L5 nerve impingement  Chronic low back pain with left sided radiculopathy   Adolescent idiopathic scoliosis, with right thoracolumbar scoliosis 22 degrees.     Her MRI shows bilateral L5 stenosis at L5-S1 due to the large disc herniation.  Educated that we sent her for a left L5-S1 TFESI due to her pain primarily being on the left leg.  When symptoms return we will plan on sending her for a bilateral L5-S1 TFESI.  No weakness noted on today exam.  Educated to continue conservative management.  She also notes neck pain that radiates into the right arm.  Educated to work with physical therapy and if symptoms do not improve we will plan on getting an MRI of her C-spine.  Plan to return to clinic in 6 months for evaluation to rule out progressive weakness.     Plan:     Return to clinic in 6 months  May repeat with a bilateral transforaminal epidural steroid injection at L5-S1     Plan formulated with Dr. Pedroza who also saw the patient and  reviewed imaging. We used the patient's imaging and models to explain their pathophysiology and treatment options. All the patient's questions were answered to their satisfaction.     Thank you for allowing me to be a part of this patient's care.     Bishop KAM Valadez PA-C   Orthopedic Spine Surgery      Again, thank you for allowing me to participate in the care of your patient.        Sincerely,        Bishop KAM Valadez PA-C    Electronically signed

## 2025-01-20 NOTE — PROGRESS NOTES
[facility] Primary Care: Integrated Behavioral Health     Integrated Behavioral Health Services   Mental Health and Addiction Services     Brief Diagnostic Assessment        PATIENT'S NAME: Maylin Cutler  MRN: [id number]     : 1995     DATE OF SERVICE: 2025  SERVICE LOCATION: Face to Face in Clinic   SERVICE MODALITY: In-person  Visit Start Time: 4:20 PM  Visit End Time:  4:38 PM     VISIT NUMBER: 1  [facility] Visit Activities: NEW     Assessments completed prior to visit:     The following assessments were completed by patient for this visit:  PHQ2:       3/20/2023     5:49 PM 2020    11:00 AM 2019    10:22 AM 2019     5:40 PM 2017     1:10 PM 2016     2:28 PM 2016    12:10 PM   PHQ-2 (  [employer])   Q1: Little interest or pleasure in doing things 1 3 2 3 1 3 2   Q2: Feeling down, depressed or hopeless 0 3 2 3 2 3 2   PHQ-2 Score 1 6 4 6 3 6 4   PHQ-2 Total Score (12-17 Years)- Positive if 3 or more points; Administer PHQ-A if positive  6 4 6      Q1: Little interest or pleasure in doing things Several days         Q2: Feeling down, depressed or hopeless Not at all         PHQ-2 Score 1           GAD2:       2023    11:39 PM 2023     1:31 PM 2023     1:55 PM 10/10/2023     1:37 PM 2025     2:20 PM   ESTEBAN-2   Feeling nervous, anxious, or on edge 1 2 2 1 2   Not being able to stop or control worrying 1 2 2 2 2   ESTEBAN-2 Total Score 2 4 4 3 4        Patient-reported     CAGE-AID:       2023    11:41 PM   CAGE-AID Total Score   Total Score 0   Total Score MyChart 0 (A total score of 2 or greater is considered clinically significant)        Identifying Information:     Patient is a 30 year old female,      ,   in a relationship  adult.  Patient attended the session alone.         Referral:   Who referred you to care:  ,  primary care provider.    Reason for referral: clarify behavioral health diagnosis and  "determine behavioral health treatment options.        Patient's Statement of Presenting Concern:     Patient reports the following reason(s) for seeking an assessment at this time:   Pt reported \" symptoms of anxiety and depression\" .  Patient stated that symptoms have resulted in the following functional impairments: health maintenance, relationship(s), and self-care     History of Presenting Concern:     Patient reports that these problem(s) began    A few Years ago. Patient has attempted to resolve these concerns in the past through: physician / PCP. Patient reports that other professional(s) are involved in providing support / services.      Social/Family History:     The patient describes their cultural background as  ,      Cultural influences and impact on patient's life structure, values, norms, and healthcare:   Racial or Ethnic Self-Identification [place] .      Contextual influences on patient's health include: Individual Factors Health Maintenance .      Cultural, Contextual, and socioeconomic factors do not affect the patient's access to services.  These factors will be addressed in the Preliminary Treatment plan.    Patient identified their preferred language to be  ,  English. Patient reported they do  need the assistance of an  or other support involved in therapy.      Current living situation:  , , , , , , , , ,  alone      Socioeconomic status and needs: does not have financial concerns.     Patient's current significant relationships include:  ,  Partner/Significant other    Patient's sexual orientation is  ,     Patient reported having   1 children.Pt reported \" step-daughter w/ partner\"      Patient identified no stable and meaningful social connections.      Patient identified the following strengths or resources that will help her     Highest education level was  ,  associate degree / vocational certificate.      Patient is currently   employed full time and reports she " is able to function appropriately at work..     Patient reported that they   have not been involved with the legal system. Do you have a probation office? Patient   denies being on probation / parole / under the jurisdiction of the court       Medical History:    Family History of Mental Health: No    Current Medical Health Concerns: None reported    Current Medication:    Patient reports current meds as:   Current Outpatient Medications   Medication Sig Dispense Refill    ALPRAZolam (XANAX) 1 MG tablet Take 1 tablet (1 mg) by mouth 2 times daily as needed (for panic attack only-use sparingly). 30 tablet 2    amphetamine-dextroamphetamine (ADDERALL XR) 30 MG 24 hr capsule Take 1 capsule (30 mg) by mouth daily. 30 capsule 0    [START ON 2/17/2025] amphetamine-dextroamphetamine (ADDERALL XR) 30 MG 24 hr capsule Take 1 capsule (30 mg) by mouth daily. 30 capsule 0    benzoyl peroxide (ACNE-CLEAR) 10 % external gel Apply topically daily 42.5 g 2    clindamycin (CLEOCIN-T) 1 % external gel APPLY TO AFFECTED AREA TWICE A DAY 60 g 2    clindamycin (CLEOCIN-T) 1 % external gel Apply topically 2 times daily 30 g 2    cyclobenzaprine (FLEXERIL) 10 MG tablet TAKE 1 TABLET (10 MG) BY MOUTH 2 TIMES DAILY AS NEEDED FOR MUSCLE SPASMS 60 tablet 3    fluticasone (FLONASE) 50 MCG/ACT nasal spray Spray 1 spray into both nostrils daily 16 g 4    gabapentin (NEURONTIN) 300 MG capsule Take 2 capsules (600 mg) by mouth 3 times daily as needed for neuropathic pain. 270 capsule 4    magnesium oxide (MAG-OX) 400 MG tablet Take 1 tablet (400 mg) by mouth daily. 30 tablet 5    Riboflavin 400 MG TABS Take 400 mg by mouth daily. 30 tablet 5    rimegepant (NURTEC) 75 MG ODT tablet Place 1 tablet (75 mg) under the tongue daily as needed for migraine. Maximum of 1 tablet every 24 hours. 8 tablet 1    Salicylic Acid 2 % LIQD Externally apply topically daily. 325 mL 3    tretinoin (RETIN-A) 0.025 % external gel Apply topically at bedtime 45 g 2     No  "current facility-administered medications for this visit.        Medication Adherence:     Patient reports taking/not taking prescription medications as prescribed:   taking psychiatric medications as prescribed.     Substance Use:      Patient reports using alcohol 3 times per week and has 2 beers and tequila at a time. Patient first started drinking at age 22.  Patient reported date of last use was 2025.  Patient reports heaviest use was 2024.  Patient denies any symptoms of withdrawal..  Patient has never had a period of abstinence..   Patient denies using tobacco  Patient denies using cannabis.   Patient denies using caffeine.   Patient reports using/abusing the following substance(s). Patient denies any history of substance use.      Substance Use: No symptoms     Based on the negative CAGE score and clinical interview there  are not indications of drug or alcohol abuse.        Significant Losses / Trauma / Abuse / Neglect Issues:     There are indications or report of significant loss, trauma, abuse or neglect issues related to: The pt. Reported \" My father  when I was 10\" .   Issues of possible neglect includes child The pt. Reported \" neglected by mom\" .           Mental Status Assessment:     Appearance:   Appropriate    Eye Contact:   Good    Psychomotor Behavior: Normal    Attitude:   Cooperative    Orientation:   All   Speech Rate / Production: Normal    Volume:   Normal    Mood:    Normal   Affect:    Appropriate    Thought Content:  Clear    Thought Form:  Coherent  Logical    Insight:    Good          Safety Assessment:     Patient denies a history of suicidal ideation, suicide attempts, self-injurious behavior, homicidal ideation, homicidal behavior, and and other safety concerns   Patient denies current or recent suicidal ideation or behaviors.   Patient denies current or recent homicidal ideation or behaviors.   Patient denies current or recent self injurious behavior or ideation. " "  Patient denies other safety concerns.   Patient reports there are/are not firearms in the house   ;  no firearms in the house   Protective Factors  ;   Sense of responsibility to family and Positive coping skills    Risk Factors Current high stress    Plan for Safety and Risk Management:     Safety and Risk: Recommended that patient call 911 or go to the local ED should there be a change in any of these risk factors.          Report to child / adult protection services was NA.        Diagnostic Criteria:     Panic Disorder, A.Recurrent unexpected panic attacks. A panic attack is an abrupt surge of intense fear or intense discomfort that reaches a peak within minutes, and during which time four (or more) of the following symptoms occur: Chest pain , Chill / hot flashes, Feeling dizzy, unsteady, light-headed, or faint, Fear of losing control or \"going crazy\", Feeling of choking, Nausea or abdominal distress, Increased heart rate/ Palpitations, Paresthesias (numbness or tingling sensations), Shortness of breath, Sweating, and Trembling / shaking, B.At least one of the attacks has been followed by 1 month (or more) of Persistent concern or worry about additional panic attacks or their consequences, C.The disturbance is not attributable to the physiological effects of a substance (e.g., a drug of abuse, a medication) or another medical condition (e.g., hyperthyroidism, cardiopulmonary disorders)., and D.The disturbance is not better explained by another mental disorder  Major Depressive Disorder  A) Recurrent episode(s) - symptoms have been present during the same 2-week period and represent a change from previous functioning 5 or more symptoms (required for diagnosis)   - Depressed mood. Note: In children and adolescents, can be irritable mood.     - Diminished interest or pleasure in all, or almost all, activities.    - Fatigue or loss of energy.    - Feelings of worthlessness or inappropriate and excessive guilt.    " - Diminished ability to think or concentrate, or indecisiveness.   B) The symptoms cause clinically significant distress or impairment in social, occupational, or other important areas of functioning  C) The episode is not attributable to the physiological effects of a substance or to another medical condition  D) The occurence of major depressive episode is not better explained by other thought / psychotic disorders  E) There has never been a manic episode or hypomanic episode     DSM5 Diagnoses:      Diagnoses: 296.32 (F33.1) Major Depressive Disorder, Recurrent Episode, Moderate _ and With anxious distress  300.01 (F41.0) Panic Disorder   Psychosocial / Contextual Factors: Trauma History       Preliminary Treatment Plan:     It is expected that patient will need less than 10 psychotherapy sessions in the next 12 months, as they have received less than 10 in the previous year.     Chemical dependency recommendations: No indications of CD issues     As a preliminary treatment goal, patient will experience a reduction in anxiety.     The patient is receiving treatment / structured support from the following professional(s) / service and treatment. Collaboration will be initiated with: primary care physician.     The following referral(s) will be initiated: Individual Outpatient Therapy .     A Release of Information is not needed at this time.             Bhaskar Kaufman [facility], [facility]   January 20, 2025    Answers submitted by the patient for this visit:  Patient Health Questionnaire (Submitted on 1/20/2025)  If you checked off any problems, how difficult have these problems made it for you to do your work, take care of things at home, or get along with other people?: Somewhat difficult  PHQ9 TOTAL SCORE: 8  Patient Health Questionnaire (G7) (Submitted on 1/20/2025)  ESTEBAN 7 TOTAL SCORE: 14

## 2025-01-21 ENCOUNTER — HOSPITAL ENCOUNTER (OUTPATIENT)
Dept: NUTRITION | Facility: CLINIC | Age: 30
Discharge: HOME | End: 2025-01-21
Attending: NURSE PRACTITIONER | Admitting: NURSE PRACTITIONER
Payer: COMMERCIAL

## 2025-01-21 DIAGNOSIS — R63.4 WEIGHT LOSS: ICD-10-CM

## 2025-01-21 DIAGNOSIS — Z86.39: ICD-10-CM

## 2025-01-21 PROCEDURE — 97802 MEDICAL NUTRITION INDIV IN: CPT | Mod: GT,95 | Performed by: DIETITIAN, REGISTERED

## 2025-01-21 NOTE — PROGRESS NOTES
"[facility]  Medical Nutrition Therapy    Visit Type: Initial Assessment    Maylin Cutler, 30 year old referred by Ashanti Peoples NP for MNT related to   Z86.39 (ICD-10-CM) - H/O caloric malnutrition   R63.4 (ICD-10-CM) - Weight loss     Virtual Visit Details    Type of service:  Video Visit   Video Start Time:  14:25  Video End Time: 15:10    Originating Location (pt. Location): Home    Distant Location (provider location):  On-site  Platform used for Video Visit: [employer]         Nutrition Assessment:  Anthropometrics  Height:   Ht Readings from Last 1 Encounters:   01/08/25 1.666 m (5' 5.59\")         BMI:  16.5   Weight Status:  Underweight BMI <18.5   Weight:   Wt Readings from Last 1 Encounters:   01/08/25 45.9 kg (101 lb 1.6 oz)       UBW:       IBW:  58 kg (128 lb) IBW %: 79%%        Weight History:   Wt Readings from Last 20 Encounters:   01/08/25 45.9 kg (101 lb 1.6 oz)   08/19/24 46.5 kg (102 lb 8 oz)   05/24/24 47.3 kg (104 lb 3.2 oz)   08/30/23 45.7 kg (100 lb 12.8 oz)   08/09/23 49 kg (108 lb)   03/21/23 48.2 kg (106 lb 3.2 oz)   11/01/22 47.6 kg (105 lb)   07/22/22 49 kg (108 lb)   05/27/22 45.8 kg (101 lb)   05/26/22 46.3 kg (102 lb)   03/24/22 48.5 kg (107 lb)   12/10/21 51.8 kg (114 lb 3.2 oz)   12/04/21 49.2 kg (108 lb 6.4 oz)   11/24/21 49.5 kg (109 lb 3.2 oz)   11/13/20 46.3 kg (102 lb)   10/26/20 46.6 kg (102 lb 12.8 oz)   09/01/20 46.2 kg (101 lb 12.8 oz)   12/04/19 45.7 kg (100 lb 12.8 oz)   11/22/19 46.1 kg (101 lb 9.6 oz)   05/17/19 44.4 kg (97 lb 12.8 oz)     -Stable wt for the past 1.5 years.   -Wt has ranged from 100-114 lb over the past 5 years.     Goal Weight:   -115 lb     Nutrition History    PMH:   Patient Active Problem List   Diagnosis    Adjustment disorder with mixed anxiety and depressed mood    Insomnia due to other mental disorder    Panic attack    Eating disorder    Mild major depression (H)    Mild major depression    ESTEBAN (generalized anxiety " disorder)    ADHD (attention deficit hyperactivity disorder), inattentive type    Protein-calorie malnutrition    Major depressive disorder, recurrent episode, moderate (H)    Intractable migraine without aura and with status migrainosus    Acute bilateral low back pain with bilateral sciatica    Acne vulgaris    Recurrent low back pain    Diabetes due to undrl condition w [facility] diabetic neuro comp (H)      -Patient reports that she has been the same weight since 8th grade. She has been between 100-108 lb.   -Patient feels healthy at her current wt.   -Patient has not tried to gain weight. She has tried to exercise to gain weight.   -Intake is 2,000 calories per day - tracks calorie intake.     Labs: reviewed  Hemoglobin A1C   Date Value Ref Range Status   01/06/2025 5.0 0.0 - 5.6 % Final     Comment:     Normal <5.7%   Prediabetes 5.7-6.4%    Diabetes 6.5% or higher     Note: Adopted from ADA consensus guidelines.        Meds:   Current Outpatient Medications   Medication Instructions    ALPRAZolam (XANAX) 1 mg, Oral, 2 TIMES DAILY PRN    amphetamine-dextroamphetamine (ADDERALL XR) 30 MG 24 hr capsule 30 mg, Oral, DAILY    [START ON 2/17/2025] amphetamine-dextroamphetamine (ADDERALL XR) 30 MG 24 hr capsule 30 mg, Oral, DAILY    benzoyl peroxide (ACNE-CLEAR) 10 % external gel Topical, DAILY    clindamycin (CLEOCIN-T) 1 % external gel Topical, 2 TIMES DAILY    clindamycin (CLEOCIN-T) 1 % external gel 2 TIMES DAILY, to affected area    cyclobenzaprine (FLEXERIL) 10 mg, Oral, 2 TIMES DAILY PRN    fluticasone (FLONASE) 50 MCG/ACT nasal spray 1 spray, Both Nostrils, DAILY    gabapentin (NEURONTIN) 600 mg, Oral, 3 TIMES DAILY PRN    magnesium oxide (MAG-OX) 400 mg, Oral, DAILY    Riboflavin 400 mg, Oral, DAILY    rimegepant (NURTEC) 75 mg, Sublingual, DAILY PRN, Maximum of 1 tablet every 24 hours.    Salicylic Acid 2 % LIQD Apply externally, DAILY    tretinoin (RETIN-A) 0.025 % external gel Topical, AT BEDTIME         Supplements: reviewed      Social/Environmental:   -average sleep per night:   -level of stress:       Food Record  Breakfast: 7:40-9:00 am: Oatmeal made with brown sugar, butter, milk OR cereal such as fruity cereal Frutie Marisol (1.5-2 cups) OR peanut butter cereal with whole milk, with grapes or other fruit   Snack: Granola bars OR apple OR bags of chips (two of them)  Lunch: [employer]'s McDouble (22 g protein) but removes the bun with fries sometimes and sweet tea or large Sprite OR other special foods   Snack: Granola bar OR apple OR chips OR [employer] again   Dinner: Meat (steak, pork, chicken), veggies, rice or noodles  Snack: Chips, pistachios, pretzels, ice cream sometimes   Beverages: Juice all types, sweet teas, not much water, milk in the morning only   -Take-out 7 days per week- [employer] daily for lunch, sometimes [employer] for breakfast, and sometimes for dinner once per week   -No salt used     Nutritional Details:   -Food allergies: none  -Food intolerances: none  -Food sensitivities: none  -GI concerns:  none  -Appetite: good   -Pace of eating: moderate-fast   -Role of cooking: self   -Role of food shopping: self      Physical Activity:  -None but on her feet as a       ASSESSED MALNUTRITION STATUS  % Weight Loss:  None noted  % Intake:  No decreased intake noted  Subcutaneous Fat Loss:  Unable to determine  Loss of Muscle Mass:  Unable to determine  Fluid Retention:  None noted    Malnutrition Diagnosis:  Patient does not meet two of the above criteria necessary for diagnosing malnutrition        Nutrition Diagnosis:  Inadequate protein-energy intake related to predicted high metabolism as evidenced by usual dietary intake of 2200 calories per day and failure to gain weight, BMI 16.5, and low daily physical activity.         Nutrition Intervention:  Nutrition Prescription Summary: MNT for Weight Gain     Nutrition Education (Content):  -Educated pt on using MyPlate for meal  planning and discussed portion sizes   -Discussed recommended and not recommended foods (choosing WGs, lean meats, low-fat dairy, fresh fruits & veggies, unsat fats, and limiting high-sodium and refined sugar foods)  -Educated pt on metabolism and used the fire analogy; talked about the importance of consuming consistent meals/snacks throughout the day and listening to hunger/fullness cues (handout provided)  -Discussed healthy snack options- protein+complex CHO  -Educated pt on reading food labels  -Discussed ways to make healthy choices when dining out (choosing baked, broiled, or grilled, unbreaded meats w/o sauces/gravies or choosing them on the side to control amount used)  -Encouraged pt to drink more water throughout the day and explained the importance of hydration.   -Encouraged pt to increase daily PA- include cardiovascular activities w/ultimate goal of 60 min per day     Emailed/mailed information discussed including nutrition handouts to patient.       Nutrition Prescription: Macronutrient and Micronutrient details   Dosing weight: Current wt (46 kg)  Energy: 2700 kcals/day ([place] Jeor)    Justification:  (underweight, weight gain of 1 lb per week)   Protein: 101-135-g Pro/day (15-20% calories)    Justification:  (Repletion)   Fluid: 2700 mL/day   (1 mL/Kcal)     Justification:  (underweight)   Fiber:     Women (19-50 years): 25 grams per day          Carbohydrate: 45-65% kcal   <25 g added sugar/day       Fat: 20-35% kcal  <7% kcal from saturated fat   Micronutrient: DRI  <2,300 mg sodium/day            Vitamin and Mineral Supplements: MVT+M       Patient Engagement:   Assessed learning needs and learning preference: Yes.  Teaching Method(s) used: Explanation  Video    Nutrition Education (Application):  a)  Discussed current eating plans / recommended alternative food choices    b)  Patient verbalizes understanding of diet by asking questions      Anticipate >75% compliance   Stage of Change:   action      Nutrition Goals:  1) Track calories and aim for 2700 calories per day   2) Get in 101-135 g of protein per day   3) Track weight weekly and goal is 1 lb gain per week  4) Eat 6 mini meals per day each consisting of protein plus 1-2 other food groups  5) Choose whole grains  6) Add additional fats to meals (peanut butter, cheese, oils, butter, avocado, nuts, seeds)  7) Drink whole milk with meals  8) Try Ensure Plus or [employer] Pro Performance Bulk 1340     Nutrition Follow Up / Monitoring:   Weight, PO intake, PA      Nutrition Recommendations:  Patient to follow-up with RD in 12 weeks.  Patient has RD contact information to call/email if needed.      Video Start Time: 14:25  Video End Time:15:10    Total Time Duration: 45 min      Lavinia Callaway MS, RD, LD, [facility]  Clinical Dietitian  [phone]

## 2025-02-03 ENCOUNTER — APPOINTMENT (OUTPATIENT)
Dept: URBAN - METROPOLITAN AREA CLINIC 252 | Age: 30
Setting detail: DERMATOLOGY
End: 2025-02-04

## 2025-02-03 VITALS — WEIGHT: 105 LBS | HEIGHT: 65 IN

## 2025-02-03 DIAGNOSIS — L70.0 ACNE VULGARIS: ICD-10-CM

## 2025-02-03 DIAGNOSIS — R21 RASH AND OTHER NONSPECIFIC SKIN ERUPTION: ICD-10-CM

## 2025-02-03 DIAGNOSIS — L08.89 OTHER SPECIFIED LOCAL INFECTIONS OF THE SKIN AND SUBCUTANEOUS TISSUE: ICD-10-CM

## 2025-02-03 DIAGNOSIS — L74.51 PRIMARY FOCAL HYPERHIDROSIS: ICD-10-CM

## 2025-02-03 PROBLEM — L74.513 PRIMARY FOCAL HYPERHIDROSIS, SOLES: Status: ACTIVE | Noted: 2025-02-03

## 2025-02-03 PROCEDURE — OTHER MIPS QUALITY: OTHER

## 2025-02-03 PROCEDURE — OTHER ADDITIONAL NOTES: OTHER

## 2025-02-03 PROCEDURE — OTHER PRESCRIPTION: OTHER

## 2025-02-03 PROCEDURE — 99214 OFFICE O/P EST MOD 30 MIN: CPT

## 2025-02-03 PROCEDURE — OTHER COUNSELING: OTHER

## 2025-02-03 PROCEDURE — OTHER PRESCRIPTION MEDICATION MANAGEMENT: OTHER

## 2025-02-03 RX ORDER — ALUMINUM CHLORIDE 20 %
20% SOLUTION, NON-ORAL TOPICAL QHS
Qty: 60 | Refills: 6 | Status: ERX | COMMUNITY
Start: 2025-02-03

## 2025-02-03 RX ORDER — ERYTHROMYCIN 20 MG/G
GEL TOPICAL BID
Qty: 60 | Refills: 1 | Status: ERX | COMMUNITY
Start: 2025-02-03

## 2025-02-03 ASSESSMENT — LOCATION DETAILED DESCRIPTION DERM
LOCATION DETAILED: LEFT PLANTAR FOREFOOT OVERLYING 2ND METATARSAL
LOCATION DETAILED: RIGHT PLANTAR FOREFOOT OVERLYING 2ND METATARSAL
LOCATION DETAILED: LEFT INFERIOR CENTRAL MALAR CHEEK
LOCATION DETAILED: RIGHT SOLE
LOCATION DETAILED: LEFT MEDIAL PLANTAR MIDFOOT
LOCATION DETAILED: LEFT SOLE
LOCATION DETAILED: RIGHT MEDIAL PLANTAR MIDFOOT

## 2025-02-03 ASSESSMENT — LOCATION SIMPLE DESCRIPTION DERM
LOCATION SIMPLE: LEFT CHEEK
LOCATION SIMPLE: RIGHT SOLE
LOCATION SIMPLE: LEFT PLANTAR SURFACE
LOCATION SIMPLE: LEFT SOLE
LOCATION SIMPLE: RIGHT PLANTAR SURFACE

## 2025-02-03 ASSESSMENT — LOCATION ZONE DERM
LOCATION ZONE: FEET
LOCATION ZONE: FACE
LOCATION ZONE: FEET

## 2025-02-03 NOTE — PROCEDURE: COUNSELING
Detail Level: Detailed
Medical Necessity Information: LCD Guidelines vary from payer to payer. Please check with your payer's policy to determine medical necessity.
Medical Necessity Clause: Botulinum toxin hyperhidrosis therapy is medically necessary because
Detail Level: Zone
Tazorac Pregnancy And Lactation Text: This medication is not safe during pregnancy. It is unknown if this medication is excreted in breast milk.
Aklief counseling:  Patient advised to apply a pea-sized amount only at bedtime and wait 30 minutes after washing their face before applying.  If too drying, patient may add a non-comedogenic moisturizer.  The most commonly reported side effects including irritation, redness, scaling, dryness, stinging, burning, itching, and increased risk of sunburn.  The patient verbalized understanding of the proper use and possible adverse effects of retinoids.  All of the patient's questions and concerns were addressed.
Topical Clindamycin Pregnancy And Lactation Text: This medication is Pregnancy Category B and is considered safe during pregnancy. It is unknown if it is excreted in breast milk.
Doxycycline Counseling:  Patient counseled regarding possible photosensitivity and increased risk for sunburn.  Patient instructed to avoid sunlight, if possible.  When exposed to sunlight, patients should wear protective clothing, sunglasses, and sunscreen.  The patient was instructed to call the office immediately if the following severe adverse effects occur:  hearing changes, easy bruising/bleeding, severe headache, or vision changes.  The patient verbalized understanding of the proper use and possible adverse effects of doxycycline.  All of the patient's questions and concerns were addressed.
Azelaic Acid Counseling: Patient counseled that medicine may cause skin irritation and to avoid applying near the eyes.  In the event of skin irritation, the patient was advised to reduce the amount of the drug applied or use it less frequently.   The patient verbalized understanding of the proper use and possible adverse effects of azelaic acid.  All of the patient's questions and concerns were addressed.
Birth Control Pills Counseling: Birth Control Pill Counseling: I discussed with the patient the potential side effects of OCPs including but not limited to increased risk of stroke, heart attack, thrombophlebitis, deep venous thrombosis, hepatic adenomas, breast changes, GI upset, headaches, and depression.  The patient verbalized understanding of the proper use and possible adverse effects of OCPs. All of the patient's questions and concerns were addressed.
Isotretinoin Pregnancy And Lactation Text: This medication is Pregnancy Category X and is considered extremely dangerous during pregnancy. It is unknown if it is excreted in breast milk.
Topical Retinoid Pregnancy And Lactation Text: This medication is Pregnancy Category C. It is unknown if this medication is excreted in breast milk.
High Dose Vitamin A Pregnancy And Lactation Text: High dose vitamin A therapy is contraindicated during pregnancy and breast feeding.
Tetracycline Counseling: Patient counseled regarding possible photosensitivity and increased risk for sunburn.  Patient instructed to avoid sunlight, if possible.  When exposed to sunlight, patients should wear protective clothing, sunglasses, and sunscreen.  The patient was instructed to call the office immediately if the following severe adverse effects occur:  hearing changes, easy bruising/bleeding, severe headache, or vision changes.  The patient verbalized understanding of the proper use and possible adverse effects of tetracycline.  All of the patient's questions and concerns were addressed. Patient understands to avoid pregnancy while on therapy due to potential birth defects.
Dapsone Counseling: I discussed with the patient the risks of dapsone including but not limited to hemolytic anemia, agranulocytosis, rashes, methemoglobinemia, kidney failure, peripheral neuropathy, headaches, GI upset, and liver toxicity.  Patients who start dapsone require monitoring including baseline LFTs and weekly CBCs for the first month, then every month thereafter.  The patient verbalized understanding of the proper use and possible adverse effects of dapsone.  All of the patient's questions and concerns were addressed.
Benzoyl Peroxide Pregnancy And Lactation Text: This medication is Pregnancy Category C. It is unknown if benzoyl peroxide is excreted in breast milk.
Spironolactone Counseling: Patient advised regarding risks of diarrhea, abdominal pain, hyperkalemia, birth defects (for female patients), liver toxicity and renal toxicity. The patient may need blood work to monitor liver and kidney function and potassium levels while on therapy. The patient verbalized understanding of the proper use and possible adverse effects of spironolactone.  All of the patient's questions and concerns were addressed.
Doxycycline Pregnancy And Lactation Text: This medication is Pregnancy Category D and not consider safe during pregnancy. It is also excreted in breast milk but is considered safe for shorter treatment courses.
Azithromycin Counseling:  I discussed with the patient the risks of azithromycin including but not limited to GI upset, allergic reaction, drug rash, diarrhea, and yeast infections.
Topical Sulfur Applications Counseling: Topical Sulfur Counseling: Patient counseled that this medication may cause skin irritation or allergic reactions.  In the event of skin irritation, the patient was advised to reduce the amount of the drug applied or use it less frequently.   The patient verbalized understanding of the proper use and possible adverse effects of topical sulfur application.  All of the patient's questions and concerns were addressed.
Azelaic Acid Pregnancy And Lactation Text: This medication is considered safe during pregnancy and breast feeding.
Sarecycline Counseling: Patient advised regarding possible photosensitivity and discoloration of the teeth, skin, lips, tongue and gums.  Patient instructed to avoid sunlight, if possible.  When exposed to sunlight, patients should wear protective clothing, sunglasses, and sunscreen.  The patient was instructed to call the office immediately if the following severe adverse effects occur:  hearing changes, easy bruising/bleeding, severe headache, or vision changes.  The patient verbalized understanding of the proper use and possible adverse effects of sarecycline.  All of the patient's questions and concerns were addressed.
Erythromycin Pregnancy And Lactation Text: This medication is Pregnancy Category B and is considered safe during pregnancy. It is also excreted in breast milk.
Winlevi Counseling:  I discussed with the patient the risks of topical clascoterone including but not limited to erythema, scaling, itching, and stinging. Patient voiced their understanding.
Bactrim Counseling:  I discussed with the patient the risks of sulfa antibiotics including but not limited to GI upset, allergic reaction, drug rash, diarrhea, dizziness, photosensitivity, and yeast infections.  Rarely, more serious reactions can occur including but not limited to aplastic anemia, agranulocytosis, methemoglobinemia, blood dyscrasias, liver or kidney failure, lung infiltrates or desquamative/blistering drug rashes.
Minocycline Counseling: Patient advised regarding possible photosensitivity and discoloration of the teeth, skin, lips, tongue and gums.  Patient instructed to avoid sunlight, if possible.  When exposed to sunlight, patients should wear protective clothing, sunglasses, and sunscreen.  The patient was instructed to call the office immediately if the following severe adverse effects occur:  hearing changes, easy bruising/bleeding, severe headache, or vision changes.  The patient verbalized understanding of the proper use and possible adverse effects of minocycline.  All of the patient's questions and concerns were addressed.
Dapsone Pregnancy And Lactation Text: This medication is Pregnancy Category C and is not considered safe during pregnancy or breast feeding.
Include Pregnancy/Lactation Warning?: No
Topical Clindamycin Counseling: Patient counseled that this medication may cause skin irritation or allergic reactions.  In the event of skin irritation, the patient was advised to reduce the amount of the drug applied or use it less frequently.   The patient verbalized understanding of the proper use and possible adverse effects of clindamycin.  All of the patient's questions and concerns were addressed.
Aklief Pregnancy And Lactation Text: It is unknown if this medication is safe to use during pregnancy.  It is unknown if this medication is excreted in breast milk.  Breastfeeding women should use the topical cream on the smallest area of the skin for the shortest time needed while breastfeeding.  Do not apply to nipple and areola.
Birth Control Pills Pregnancy And Lactation Text: This medication should be avoided if pregnant and for the first 30 days post-partum.
Tazorac Counseling:  Patient advised that medication is irritating and drying.  Patient may need to apply sparingly and wash off after an hour before eventually leaving it on overnight.  The patient verbalized understanding of the proper use and possible adverse effects of tazorac.  All of the patient's questions and concerns were addressed.
Tetracycline Pregnancy And Lactation Text: This medication is Pregnancy Category D and not consider safe during pregnancy. It is also excreted in breast milk.
High Dose Vitamin A Counseling: Side effects reviewed, pt to contact office should one occur.
Benzoyl Peroxide Counseling: Patient counseled that medicine may cause skin irritation and bleach clothing.  In the event of skin irritation, the patient was advised to reduce the amount of the drug applied or use it less frequently.   The patient verbalized understanding of the proper use and possible adverse effects of benzoyl peroxide.  All of the patient's questions and concerns were addressed.
Isotretinoin Counseling: Patient should get monthly blood tests, not donate blood, not drive at night if vision affected, not share medication, and not undergo elective surgery for 6 months after tx completed. Side effects reviewed, pt to contact office should one occur.
Winlevi Pregnancy And Lactation Text: This medication is considered safe during pregnancy and breastfeeding.
Bactrim Pregnancy And Lactation Text: This medication is Pregnancy Category D and is known to cause fetal risk.  It is also excreted in breast milk.
Topical Retinoid counseling:  Patient advised to apply a pea-sized amount only at bedtime and wait 30 minutes after washing their face before applying.  If too drying, patient may add a non-comedogenic moisturizer. The patient verbalized understanding of the proper use and possible adverse effects of retinoids.  All of the patient's questions and concerns were addressed.
Spironolactone Pregnancy And Lactation Text: This medication can cause feminization of the male fetus and should be avoided during pregnancy. The active metabolite is also found in breast milk.
Azithromycin Pregnancy And Lactation Text: This medication is considered safe during pregnancy and is also secreted in breast milk.
Topical Sulfur Applications Pregnancy And Lactation Text: This medication is Pregnancy Category C and has an unknown safety profile during pregnancy. It is unknown if this topical medication is excreted in breast milk.
Erythromycin Counseling:  I discussed with the patient the risks of erythromycin including but not limited to GI upset, allergic reaction, drug rash, diarrhea, increase in liver enzymes, and yeast infections.

## 2025-02-03 NOTE — HPI: RASH
What Type Of Note Output Would You Prefer (Optional)?: Standard Output
Is The Patient Presenting As Previously Scheduled?: Yes
How Severe Is Your Rash?: mild
Is This A New Presentation, Or A Follow-Up?: Rash
Additional History: Pt reports rash started after getting a feet treatment 4 months ago.

## 2025-02-03 NOTE — PROCEDURE: ADDITIONAL NOTES
Render Risk Assessment In Note?: no
Detail Level: Simple
Additional Notes: - Reassured no fungal or wart virus seen on today’s clinical exam. \\n- Recommended letting the feet air out daily to prevent aggravation. \\n- Recommended using keratolytic solution to help alleviate calluses.

## 2025-02-03 NOTE — PROCEDURE: PRESCRIPTION MEDICATION MANAGEMENT
Detail Level: Zone
Initiate Treatment: Drysol daily to affected areas on the feet
Render In Strict Bullet Format?: No
Plan: Patient to call for refills.
Continue Regimen: Retin-a 0.01 Qhs as tolerated

## 2025-02-11 ENCOUNTER — MYC MEDICAL ADVICE (OUTPATIENT)
Dept: OBGYN | Facility: CLINIC | Age: 30
End: 2025-02-11
Payer: COMMERCIAL

## 2025-02-11 DIAGNOSIS — L70.0 ACNE VULGARIS: ICD-10-CM

## 2025-02-11 RX ORDER — BENZOYL PEROXIDE 100 MG/ML
GEL TOPICAL DAILY
Qty: 60 G | Refills: 2 | Status: SHIPPED | OUTPATIENT
Start: 2025-02-11

## 2025-02-11 NOTE — TELEPHONE ENCOUNTER
[employer] message received from patient.     Patient states this was sent to the wrong provider.     Closing encounter.     Shaina FOWLER RN - [facility]

## 2025-03-09 ENCOUNTER — HEALTH MAINTENANCE LETTER (OUTPATIENT)
Age: 30
End: 2025-03-09

## 2025-03-25 ENCOUNTER — E-VISIT (OUTPATIENT)
Dept: URGENT CARE | Facility: CLINIC | Age: 30
End: 2025-03-25
Payer: COMMERCIAL

## 2025-03-25 DIAGNOSIS — N39.0 ACUTE UTI (URINARY TRACT INFECTION): ICD-10-CM

## 2025-03-25 DIAGNOSIS — B37.31 YEAST INFECTION OF THE VAGINA: Primary | ICD-10-CM

## 2025-03-25 PROCEDURE — 99207 PR NON-BILLABLE SERV PER CHARTING: CPT | Performed by: EMERGENCY MEDICINE

## 2025-03-25 RX ORDER — FLUCONAZOLE 150 MG/1
150 TABLET ORAL ONCE
Qty: 1 TABLET | Refills: 0 | Status: SHIPPED | OUTPATIENT
Start: 2025-03-25 | End: 2025-03-25

## 2025-03-25 RX ORDER — NITROFURANTOIN 25; 75 MG/1; MG/1
100 CAPSULE ORAL 2 TIMES DAILY
Qty: 10 CAPSULE | Refills: 0 | Status: SHIPPED | OUTPATIENT
Start: 2025-03-25 | End: 2025-03-30

## 2025-03-25 NOTE — PATIENT INSTRUCTIONS
Dear Maylin Cutler    After reviewing your responses, I've been able to diagnose you with a urinary tract infection, which is a common infection of the bladder with bacteria.  This is not a sexually transmitted infection, though urinating immediately after intercourse can help prevent infections.  Drinking lots of fluids is also helpful to clear your current infection and prevent the next one.      I have sent a prescription for antibiotics to your pharmacy to treat this infection.    It is important that you take all of your prescribed medication even if your symptoms are improving after a few doses.  Taking all of your medicine helps prevent the symptoms from returning.     If your symptoms worsen, you develop pain in your back or stomach, develop fevers, or are not improving in 5 days, please contact your primary care provider for an appointment or visit any of our convenient Walk-in or Urgent Care Centers to be seen, which can be found on our website here.    Thanks again for choosing us as your health care partner,    Alex Castillo MD  Urinary Tract Infection (UTI) in Women: Care Instructions  Overview     A urinary tract infection (UTI) is an infection caused by bacteria. It can happen anywhere in the urinary tract. A UTI can happen in the:  Kidneys.  Ureters, the tubes that connect the kidneys to the bladder.  Bladder.  Urethra, where the urine comes out.  Most UTIs are bladder infections. They often cause pain or burning when you urinate.  Most UTIs can be cured with antibiotics. If you are prescribed antibiotics, be sure to complete your treatment so that the infection does not get worse.  Follow-up care is a key part of your treatment and safety. Be sure to make and go to all appointments, and call your doctor if you are having problems. It's also a good idea to know your test results and keep a list of the medicines you take.  How can you care for yourself at home?  Take your antibiotics as directed.  "Do not stop taking them just because you feel better. You need to take the full course of antibiotics.  Drink extra water and other fluids for the next day or two. This will help make the urine less concentrated and help wash out the bacteria that are causing the infection. (If you have kidney, heart, or liver disease and have to limit fluids, talk with your doctor before you increase the amount of fluids you drink.)  Avoid drinks that are carbonated or have caffeine. They can irritate the bladder.  Urinate often. Try to empty your bladder each time.  To relieve pain, take a hot bath or lay a heating pad set on low over your lower belly or genital area. Never go to sleep with a heating pad in place.  To prevent UTIs  Drink plenty of water each day. This helps you urinate often, which clears bacteria from your system. (If you have kidney, heart, or liver disease and have to limit fluids, talk with your doctor before you increase the amount of fluids you drink.)  Urinate when you need to.  If you are sexually active, urinate right after you have sex.  Change sanitary pads often.  Avoid douches, bubble baths, feminine hygiene sprays, and other feminine hygiene products that have deodorants.  After going to the bathroom, wipe from front to back.  When should you call for help?   Call your doctor now or seek immediate medical care if:    You have new or worse fever, chills, nausea, or vomiting.     You have new pain in your back just below your rib cage. This is called flank pain.     There is new blood or pus in your urine.     You have any problems with your antibiotic medicine.   Watch closely for changes in your health, and be sure to contact your doctor if:    You are not getting better after taking an antibiotic for 2 days.     Your symptoms go away but then come back.   Where can you learn more?  Go to https://www.healthwise.net/patiented  Enter K848 in the search box to learn more about \"Urinary Tract Infection " "(UTI) in Women: Care Instructions.\"  Current as of: April 30, 2024  Content Version: 14.4    7050-9752 [employer].   Care instructions adapted under license by your healthcare professional. If you have questions about a medical condition or this instruction, always ask your healthcare professional. [employer] disclaims any warranty or liability for your use of this information.    "

## 2025-04-02 ENCOUNTER — VIRTUAL VISIT (OUTPATIENT)
Dept: FAMILY MEDICINE | Facility: CLINIC | Age: 30
End: 2025-04-02
Payer: COMMERCIAL

## 2025-04-02 DIAGNOSIS — F41.0 PANIC ATTACK: ICD-10-CM

## 2025-04-02 DIAGNOSIS — G43.011 INTRACTABLE MIGRAINE WITHOUT AURA AND WITH STATUS MIGRAINOSUS: ICD-10-CM

## 2025-04-02 DIAGNOSIS — F33.1 MAJOR DEPRESSIVE DISORDER, RECURRENT EPISODE, MODERATE (H): ICD-10-CM

## 2025-04-02 DIAGNOSIS — M54.50 RECURRENT LOW BACK PAIN: ICD-10-CM

## 2025-04-02 DIAGNOSIS — M53.3 SACROILIAC JOINT PAIN: ICD-10-CM

## 2025-04-02 DIAGNOSIS — F90.0 ADHD (ATTENTION DEFICIT HYPERACTIVITY DISORDER), INATTENTIVE TYPE: Primary | ICD-10-CM

## 2025-04-02 PROBLEM — E08.49 DIABETES DUE TO UNDRL CONDITION W OTH DIABETIC NEURO COMP (H): Status: RESOLVED | Noted: 2024-12-19 | Resolved: 2025-04-02

## 2025-04-02 PROCEDURE — 98006 SYNCH AUDIO-VIDEO EST MOD 30: CPT | Performed by: NURSE PRACTITIONER

## 2025-04-02 RX ORDER — ALPRAZOLAM 1 MG/1
1 TABLET ORAL 2 TIMES DAILY PRN
Qty: 30 TABLET | Refills: 2 | Status: SHIPPED | OUTPATIENT
Start: 2025-04-02

## 2025-04-02 RX ORDER — DEXTROAMPHETAMINE SACCHARATE, AMPHETAMINE ASPARTATE MONOHYDRATE, DEXTROAMPHETAMINE SULFATE AND AMPHETAMINE SULFATE 7.5; 7.5; 7.5; 7.5 MG/1; MG/1; MG/1; MG/1
30 CAPSULE, EXTENDED RELEASE ORAL DAILY
Qty: 30 CAPSULE | Refills: 0 | Status: SHIPPED | OUTPATIENT
Start: 2025-05-02 | End: 2025-06-01

## 2025-04-02 RX ORDER — DEXTROAMPHETAMINE SACCHARATE, AMPHETAMINE ASPARTATE MONOHYDRATE, DEXTROAMPHETAMINE SULFATE AND AMPHETAMINE SULFATE 7.5; 7.5; 7.5; 7.5 MG/1; MG/1; MG/1; MG/1
30 CAPSULE, EXTENDED RELEASE ORAL DAILY
Qty: 30 CAPSULE | Refills: 0 | Status: SHIPPED | OUTPATIENT
Start: 2025-06-01 | End: 2025-07-01

## 2025-04-02 RX ORDER — DEXTROAMPHETAMINE SACCHARATE, AMPHETAMINE ASPARTATE MONOHYDRATE, DEXTROAMPHETAMINE SULFATE AND AMPHETAMINE SULFATE 7.5; 7.5; 7.5; 7.5 MG/1; MG/1; MG/1; MG/1
30 CAPSULE, EXTENDED RELEASE ORAL DAILY
Qty: 30 CAPSULE | Refills: 0 | Status: SHIPPED | OUTPATIENT
Start: 2025-04-02 | End: 2025-05-02

## 2025-04-02 ASSESSMENT — ANXIETY QUESTIONNAIRES
GAD7 TOTAL SCORE: 15
2. NOT BEING ABLE TO STOP OR CONTROL WORRYING: MORE THAN HALF THE DAYS
7. FEELING AFRAID AS IF SOMETHING AWFUL MIGHT HAPPEN: NEARLY EVERY DAY
GAD7 TOTAL SCORE: 15
3. WORRYING TOO MUCH ABOUT DIFFERENT THINGS: MORE THAN HALF THE DAYS
5. BEING SO RESTLESS THAT IT IS HARD TO SIT STILL: MORE THAN HALF THE DAYS
IF YOU CHECKED OFF ANY PROBLEMS ON THIS QUESTIONNAIRE, HOW DIFFICULT HAVE THESE PROBLEMS MADE IT FOR YOU TO DO YOUR WORK, TAKE CARE OF THINGS AT HOME, OR GET ALONG WITH OTHER PEOPLE: VERY DIFFICULT
1. FEELING NERVOUS, ANXIOUS, OR ON EDGE: MORE THAN HALF THE DAYS
4. TROUBLE RELAXING: MORE THAN HALF THE DAYS
6. BECOMING EASILY ANNOYED OR IRRITABLE: MORE THAN HALF THE DAYS
7. FEELING AFRAID AS IF SOMETHING AWFUL MIGHT HAPPEN: NEARLY EVERY DAY
8. IF YOU CHECKED OFF ANY PROBLEMS, HOW DIFFICULT HAVE THESE MADE IT FOR YOU TO DO YOUR WORK, TAKE CARE OF THINGS AT HOME, OR GET ALONG WITH OTHER PEOPLE?: VERY DIFFICULT
GAD7 TOTAL SCORE: 15

## 2025-04-02 NOTE — PROGRESS NOTES
April is a 30 year old who is being evaluated via a billable video visit.    How would you like to obtain your AVS? MyChart  If the video visit is dropped, the invitation should be resent by: Text to cell phone: [phone]  Will anyone else be joining your video visit? No      Assessment & Plan     Panic attack  Has been using a little more xanax since being out of adderall, uses 3-4 times weekly for high anxiety or panic and it works well for her if needed. Overall mental health is good. Denies depression  - ALPRAZolam (XANAX) 1 MG tablet; Take 1 tablet (1 mg) by mouth 2 times daily as needed (for panic attack only-use sparingly).    Sacroiliac joint pain  Uses gabapentin and muscle relaxer's as needed for SI joint/ back pain. Not needing refills at this time.     Recurrent low back pain  Uses gabapentin and muscle relaxer's as needed for SI joint/ back pain. Not needing refills at this time.     Intractable migraine without aura and with status migrainosus  Uses migraine medication as needed for migraine headache and medication works if needed not needing refills at this time.     ADHD (attention deficit hyperactivity disorder), inattentive type  Has been out of medication a short time, struggling with fatigue, lack of focus, concentration, task completion.  Diet has been improving. Medication works well when taking. CSA UTD.  Needing refills.   - amphetamine-dextroamphetamine (ADDERALL XR) 30 MG 24 hr capsule; Take 1 capsule (30 mg) by mouth daily.  - amphetamine-dextroamphetamine (ADDERALL XR) 30 MG 24 hr capsule; Take 1 capsule (30 mg) by mouth daily.  - amphetamine-dextroamphetamine (ADDERALL XR) 30 MG 24 hr capsule; Take 1 capsule (30 mg) by mouth daily.    Major depressive disorder, recurrent episode, moderate (H)   Overall mental health is good. Denies depression    See Patient Instructions    Subjective   April is a 30 year old, presenting for the following health issues:  Medication Refill         4/2/2025    12:41 PM   Additional Questions   Roomed by Patient completed questions via [employer]         9/9/2024    12:11 PM 1/20/2025     2:20 PM 4/2/2025    11:00 AM   ESTEBAN-7 SCORE   Total Score 7 (mild anxiety) 14 (moderate anxiety) 15 (severe anxiety)   Total Score 7 14  15        Patient-reported            9/9/2024    12:09 PM 1/20/2025     2:19 PM 4/2/2025    11:00 AM   PHQ   PHQ-9 Total Score 2 8  8    Q9: Thoughts of better off dead/self-harm past 2 weeks Not at all Not at all Not at all       Patient-reported        Medication Refill    History of Present Illness       Reason for visit:  Meds refil    She eats 2-3 servings of fruits and vegetables daily.She consumes 2 sweetened beverage(s) daily.She exercises with enough effort to increase her heart rate 20 to 29 minutes per day.  She exercises with enough effort to increase her heart rate 7 days per week.   She is taking medications regularly.            Review of Systems  Constitutional, HEENT, cardiovascular, pulmonary, GI, , musculoskeletal, neuro, skin, endocrine and psych systems are negative, except as otherwise noted.      Objective           Vitals:  No vitals were obtained today due to virtual visit.    Physical Exam   GENERAL: alert and no distress  EYES: Eyes grossly normal to inspection.  No discharge or erythema, or obvious scleral/conjunctival abnormalities.  RESP: No audible wheeze, cough, or visible cyanosis.    SKIN: Visible skin clear. No significant rash, abnormal pigmentation or lesions.  NEURO: Cranial nerves grossly intact.  Mentation and speech appropriate for age.  PSYCH: Appropriate affect, tone, and pace of words    See orders      Video-Visit Details    Type of service:  Video Visit   Originating Location (pt. Location): Other work    Distant Location (provider location):  Off-site  Platform used for Video Visit: Amol  Signed Electronically by: NEVAEH BARNEY

## 2025-04-08 ENCOUNTER — TELEPHONE (OUTPATIENT)
Dept: NEUROSURGERY | Facility: CLINIC | Age: 30
End: 2025-04-08
Payer: COMMERCIAL

## 2025-04-08 NOTE — TELEPHONE ENCOUNTER
Attempted to reach patient to remind them about appointment scheduled with Gabby Jimenez DO on 4/9/25 in our [place] clinic.    A voicemail was left with a call back number if the patient has questions or would like to reschedule.

## 2025-04-17 DIAGNOSIS — J30.1 SEASONAL ALLERGIC RHINITIS DUE TO POLLEN: ICD-10-CM

## 2025-04-17 RX ORDER — FLUTICASONE PROPIONATE 50 MCG
1 SPRAY, SUSPENSION (ML) NASAL DAILY
Qty: 16 ML | Refills: 2 | Status: SHIPPED | OUTPATIENT
Start: 2025-04-17

## 2025-05-02 ENCOUNTER — MYC MEDICAL ADVICE (OUTPATIENT)
Dept: FAMILY MEDICINE | Facility: CLINIC | Age: 30
End: 2025-05-02
Payer: COMMERCIAL

## 2025-05-02 NOTE — TELEPHONE ENCOUNTER
Called patient and left a voicemail to return our call to the clinic.       Sarahi Fuentes RN on 5/2/2025 at 10:52 AM

## 2025-05-03 ENCOUNTER — NURSE TRIAGE (OUTPATIENT)
Dept: NURSING | Facility: CLINIC | Age: 30
End: 2025-05-03
Payer: COMMERCIAL

## 2025-05-03 NOTE — TELEPHONE ENCOUNTER
"Nurse Triage SBAR    Is this a 2nd Level Triage? NO    Situation: Patient calling for triage, states she has a hemorrhoid that is large and painful.     Background: Hx of hemorrhoids but much smaller in size.     Assessment: Symptoms started yesterday. She coughed and then started feeling the pain.     Began feeling a poke on the outside of the anus    Keeps getting larger in size    Tried pushing back in, won't go back in now.     Very painful, 10/10    Ibuprofen and Tylenol - not helping  Tried Prep H wipes and Prep H ointment    Nausea+     Bleeding+ Bright red blood, sees with dabbing. Not a constant flow.    Denies fever/chills.     Protocol Recommended Disposition: Emergency Department. Patient verbalized understanding and had no further questions.      Jacklyn Erwin RN  [facility] Nurse Advisor  5/3/2025 12:52 PM       Reason for Disposition   SEVERE dizziness (e.g., unable to stand, requires support to walk, feels like passing out now)    Additional Information   Negative: Shock suspected (e.g., cold/pale/clammy skin, too weak to stand, low BP, rapid pulse)   Negative: Difficult to awaken or acting confused (e.g., disoriented, slurred speech)   Negative: Passed out (i.e., lost consciousness, collapsed and was not responding)   Negative: [1] Vomiting AND [2] contains red blood or black (\"coffee ground\") material  (Exception: Few red streaks in vomit that only happened once.)   Negative: Sounds like a life-threatening emergency to the triager   Negative: Diarrhea is main symptom   Negative: Stool color other than brown or tan is main concern  (no bleeding and no melena)   Negative: SEVERE rectal bleeding (large blood clots; constant or on and off bleeding)    Protocols used: Rectal Bleeding-A-AH    "

## 2025-05-17 DIAGNOSIS — L70.0 ACNE VULGARIS: ICD-10-CM

## 2025-05-17 RX ORDER — CLINDAMYCIN PHOSPHATE 10 MG/G
GEL TOPICAL 2 TIMES DAILY
Qty: 60 G | Refills: 2 | Status: SHIPPED | OUTPATIENT
Start: 2025-05-17

## 2025-05-18 DIAGNOSIS — M53.3 SACROILIAC JOINT PAIN: ICD-10-CM

## 2025-05-18 DIAGNOSIS — M54.50 RECURRENT LOW BACK PAIN: ICD-10-CM

## 2025-05-19 RX ORDER — GABAPENTIN 300 MG/1
600 CAPSULE ORAL 3 TIMES DAILY PRN
Qty: 180 CAPSULE | Refills: 7 | Status: SHIPPED | OUTPATIENT
Start: 2025-05-19

## 2025-05-28 DIAGNOSIS — G43.011 INTRACTABLE MIGRAINE WITHOUT AURA AND WITH STATUS MIGRAINOSUS: ICD-10-CM

## 2025-05-28 RX ORDER — ELETRIPTAN HYDROBROMIDE 20 MG/1
TABLET, FILM COATED ORAL
Qty: 18 TABLET | Refills: 3 | OUTPATIENT
Start: 2025-05-28

## 2025-06-25 ENCOUNTER — VIRTUAL VISIT (OUTPATIENT)
Dept: FAMILY MEDICINE | Facility: CLINIC | Age: 30
End: 2025-06-25
Payer: COMMERCIAL

## 2025-06-25 DIAGNOSIS — G43.011 INTRACTABLE MIGRAINE WITHOUT AURA AND WITH STATUS MIGRAINOSUS: ICD-10-CM

## 2025-06-25 DIAGNOSIS — F90.0 ADHD (ATTENTION DEFICIT HYPERACTIVITY DISORDER), INATTENTIVE TYPE: Primary | ICD-10-CM

## 2025-06-25 DIAGNOSIS — N64.4 BREAST PAIN: ICD-10-CM

## 2025-06-25 DIAGNOSIS — F41.0 PANIC ATTACK: ICD-10-CM

## 2025-06-25 DIAGNOSIS — Z98.82 BREAST IMPLANT IN SITU: ICD-10-CM

## 2025-06-25 PROCEDURE — 98014 SYNCH AUDIO-ONLY EST MOD 30: CPT | Performed by: NURSE PRACTITIONER

## 2025-06-25 RX ORDER — DEXTROAMPHETAMINE SACCHARATE, AMPHETAMINE ASPARTATE MONOHYDRATE, DEXTROAMPHETAMINE SULFATE AND AMPHETAMINE SULFATE 7.5; 7.5; 7.5; 7.5 MG/1; MG/1; MG/1; MG/1
30 CAPSULE, EXTENDED RELEASE ORAL DAILY
Qty: 30 CAPSULE | Refills: 0 | Status: SHIPPED | OUTPATIENT
Start: 2025-08-24 | End: 2025-09-23

## 2025-06-25 RX ORDER — DEXTROAMPHETAMINE SACCHARATE, AMPHETAMINE ASPARTATE MONOHYDRATE, DEXTROAMPHETAMINE SULFATE AND AMPHETAMINE SULFATE 7.5; 7.5; 7.5; 7.5 MG/1; MG/1; MG/1; MG/1
30 CAPSULE, EXTENDED RELEASE ORAL DAILY
Qty: 30 CAPSULE | Refills: 0 | Status: SHIPPED | OUTPATIENT
Start: 2025-06-25 | End: 2025-07-25

## 2025-06-25 RX ORDER — ELETRIPTAN HYDROBROMIDE 20 MG/1
20 TABLET ORAL
COMMUNITY
Start: 2025-04-29

## 2025-06-25 RX ORDER — ALPRAZOLAM 1 MG/1
1 TABLET ORAL 2 TIMES DAILY PRN
Qty: 45 TABLET | Refills: 3 | Status: SHIPPED | OUTPATIENT
Start: 2025-06-25

## 2025-06-25 RX ORDER — DEXTROAMPHETAMINE SACCHARATE, AMPHETAMINE ASPARTATE MONOHYDRATE, DEXTROAMPHETAMINE SULFATE AND AMPHETAMINE SULFATE 7.5; 7.5; 7.5; 7.5 MG/1; MG/1; MG/1; MG/1
30 CAPSULE, EXTENDED RELEASE ORAL DAILY
Qty: 30 CAPSULE | Refills: 0 | Status: SHIPPED | OUTPATIENT
Start: 2025-07-25 | End: 2025-08-24

## 2025-06-25 ASSESSMENT — PATIENT HEALTH QUESTIONNAIRE - PHQ9
10. IF YOU CHECKED OFF ANY PROBLEMS, HOW DIFFICULT HAVE THESE PROBLEMS MADE IT FOR YOU TO DO YOUR WORK, TAKE CARE OF THINGS AT HOME, OR GET ALONG WITH OTHER PEOPLE: VERY DIFFICULT
SUM OF ALL RESPONSES TO PHQ QUESTIONS 1-9: 8
SUM OF ALL RESPONSES TO PHQ QUESTIONS 1-9: 8

## 2025-06-25 NOTE — PROGRESS NOTES
April is a 30 year old who is being evaluated via a billable telephone visit.    What phone number would you like to be contacted at? [phone]  How would you like to obtain your AVS? Ashlee  Originating Location (pt. Location): Home    Distant Location (provider location):  Off-site  Telephone visit completed due to the video connection was lost and majority of visit was completed via audio-only.    Assessment & Plan     Panic attack  Having increased anxiety. Has therapy appt schedule for July 7 that she has been waiting almost 3 months to be seen. Started a job and quit due to increased anxiety. Had been on an antidepressant as a teen and it did not work well for her; hesitant to try as an adult. Discussed how antidepressants work, tips given on AVS.If not improving should consider trying. Temporarily will increased xanax, discussed need for fight or flight response, risk for addiction. Use only as needed for panic.   - ALPRAZolam (XANAX) 1 MG tablet; Take 1 tablet (1 mg) by mouth 2 times daily as needed (for panic attack only-use sparingly).    ADHD (attention deficit hyperactivity disorder), inattentive type  Feels adderall is working well for her adhd, does not feel this is worsening her anxiety. CSA UTD. Needing refills  - amphetamine-dextroamphetamine (ADDERALL XR) 30 MG 24 hr capsule; Take 1 capsule (30 mg) by mouth daily.  - amphetamine-dextroamphetamine (ADDERALL XR) 30 MG 24 hr capsule; Take 1 capsule (30 mg) by mouth daily.  - amphetamine-dextroamphetamine (ADDERALL XR) 30 MG 24 hr capsule; Take 1 capsule (30 mg) by mouth daily.    Intractable migraine without aura and with status migrainosus  Has 4-6 migraines per month, nurtec works well. Needing refills  - rimegepant (NURTEC) 75 MG ODT tablet; Place 1 tablet (75 mg) under the tongue daily as needed for migraine. Maximum of 1 tablet every 24 hours.    Breast implant in situ  R breast pain, has implants, discussed need for imaging. Verify coverage  with insurance prior to scheduling.   - MR Breast Bilateral w/o & w Contrast; Future    Breast pain  R breast pain, has implants, discussed need for imaging. Verify coverage with insurance prior to scheduling.   - MR Breast Bilateral w/o & w Contrast; Future            Follow-up  Return in about 4 months (around 10/25/2025), or if symptoms worsen or fail to improve.    Subjective   April is a 30 year old, presenting for the following health issues:  Medication Refill        6/25/2025     1:51 PM   Additional Questions   Roomed by Patient completed questions via [employer]     HPI          Review of Systems  Constitutional, HEENT, cardiovascular, pulmonary, GI, , musculoskeletal, neuro, skin, endocrine and psych systems are negative, except as otherwise noted.      Objective           Vitals:  No vitals were obtained today due to virtual visit.    Physical Exam   General: Alert and no distress //Respiratory: No audible wheeze, cough, or shortness of breath // Psychiatric:  Appropriate affect, tone, and pace of words      See orders    The longitudinal plan of care for the diagnosis(es)/condition(s) as documented were addressed during this visit. Due to the added complexity in care, I will continue to support April in the subsequent management and with ongoing continuity of care.        Phone call duration: 12 minutes + orders + charting  Signed Electronically by: NEVAEH BARNEY

## 2025-07-06 ENCOUNTER — RX ONLY (RX ONLY)
Age: 30
End: 2025-07-06

## 2025-07-06 RX ORDER — TRETINOIN 0.1 MG/G
GEL TOPICAL
Qty: 45 | Refills: 3 | Status: ERX

## 2025-08-14 DIAGNOSIS — L70.0 ACNE VULGARIS: ICD-10-CM

## 2025-08-14 RX ORDER — BENZOYL PEROXIDE 100 MG/ML
GEL TOPICAL DAILY
Qty: 60 G | Refills: 2 | Status: SHIPPED | OUTPATIENT
Start: 2025-08-14

## 2025-08-24 DIAGNOSIS — M53.3 SACROILIAC JOINT PAIN: ICD-10-CM

## 2025-08-25 RX ORDER — CYCLOBENZAPRINE HCL 10 MG
10 TABLET ORAL 2 TIMES DAILY PRN
Qty: 60 TABLET | Refills: 3 | Status: SHIPPED | OUTPATIENT
Start: 2025-08-25

## (undated) RX ORDER — BUPIVACAINE HYDROCHLORIDE 5 MG/ML
INJECTION, SOLUTION EPIDURAL; INTRACAUDAL
Status: DISPENSED
Start: 2024-12-16

## (undated) RX ORDER — LIDOCAINE HYDROCHLORIDE 10 MG/ML
INJECTION, SOLUTION EPIDURAL; INFILTRATION; INTRACAUDAL; PERINEURAL
Status: DISPENSED
Start: 2024-12-16

## (undated) RX ORDER — METHYLPREDNISOLONE ACETATE 80 MG/ML
INJECTION, SUSPENSION INTRA-ARTICULAR; INTRALESIONAL; INTRAMUSCULAR; SOFT TISSUE
Status: DISPENSED
Start: 2024-12-16